# Patient Record
Sex: MALE | Race: WHITE | Employment: UNEMPLOYED | URBAN - METROPOLITAN AREA
[De-identification: names, ages, dates, MRNs, and addresses within clinical notes are randomized per-mention and may not be internally consistent; named-entity substitution may affect disease eponyms.]

---

## 2017-11-25 ENCOUNTER — HOSPITAL ENCOUNTER (EMERGENCY)
Age: 37
Discharge: LEFT BEFORE DISCHARGE | End: 2017-11-25
Attending: EMERGENCY MEDICINE | Admitting: EMERGENCY MEDICINE
Payer: COMMERCIAL

## 2017-11-25 VITALS
TEMPERATURE: 98.4 F | SYSTOLIC BLOOD PRESSURE: 129 MMHG | OXYGEN SATURATION: 95 % | HEART RATE: 85 BPM | RESPIRATION RATE: 18 BRPM | DIASTOLIC BLOOD PRESSURE: 77 MMHG

## 2017-11-25 DIAGNOSIS — J06.9 ACUTE UPPER RESPIRATORY INFECTION: Primary | ICD-10-CM

## 2017-11-25 PROCEDURE — 99281 EMR DPT VST MAYX REQ PHY/QHP: CPT

## 2017-11-25 RX ORDER — DIAZEPAM 5 MG/1
5 TABLET ORAL
Status: ON HOLD | COMMUNITY
End: 2022-03-11

## 2017-11-25 RX ORDER — PREDNISONE 20 MG/1
40 TABLET ORAL DAILY
Qty: 15 TAB | Refills: 0 | Status: SHIPPED | OUTPATIENT
Start: 2017-11-25 | End: 2017-11-29

## 2017-11-25 RX ORDER — PREDNISONE 20 MG/1
60 TABLET ORAL
Status: DISCONTINUED | OUTPATIENT
Start: 2017-11-25 | End: 2017-11-26 | Stop reason: HOSPADM

## 2017-11-25 RX ORDER — QUETIAPINE FUMARATE 400 MG/1
200 TABLET, FILM COATED ORAL 3 TIMES DAILY
COMMUNITY
End: 2022-03-16

## 2017-11-26 NOTE — DISCHARGE INSTRUCTIONS
Upper Respiratory Infection (Cold): Care Instructions  Your Care Instructions    An upper respiratory infection, or URI, is an infection of the nose, sinuses, or throat. URIs are spread by coughs, sneezes, and direct contact. The common cold is the most frequent kind of URI. The flu and sinus infections are other kinds of URIs. Almost all URIs are caused by viruses. Antibiotics won't cure them. But you can treat most infections with home care. This may include drinking lots of fluids and taking over-the-counter pain medicine. You will probably feel better in 4 to 10 days. The doctor has checked you carefully, but problems can develop later. If you notice any problems or new symptoms, get medical treatment right away. Follow-up care is a key part of your treatment and safety. Be sure to make and go to all appointments, and call your doctor if you are having problems. It's also a good idea to know your test results and keep a list of the medicines you take. How can you care for yourself at home? · To prevent dehydration, drink plenty of fluids, enough so that your urine is light yellow or clear like water. Choose water and other caffeine-free clear liquids until you feel better. If you have kidney, heart, or liver disease and have to limit fluids, talk with your doctor before you increase the amount of fluids you drink. · Take an over-the-counter pain medicine, such as acetaminophen (Tylenol), ibuprofen (Advil, Motrin), or naproxen (Aleve). Read and follow all instructions on the label. · Before you use cough and cold medicines, check the label. These medicines may not be safe for young children or for people with certain health problems. · Be careful when taking over-the-counter cold or flu medicines and Tylenol at the same time. Many of these medicines have acetaminophen, which is Tylenol. Read the labels to make sure that you are not taking more than the recommended dose.  Too much acetaminophen (Tylenol) can be harmful. · Get plenty of rest.  · Do not smoke or allow others to smoke around you. If you need help quitting, talk to your doctor about stop-smoking programs and medicines. These can increase your chances of quitting for good. When should you call for help? Call 911 anytime you think you may need emergency care. For example, call if:  ? · You have severe trouble breathing. ?Call your doctor now or seek immediate medical care if:  ? · You seem to be getting much sicker. ? · You have new or worse trouble breathing. ? · You have a new or higher fever. ? · You have a new rash. ? Watch closely for changes in your health, and be sure to contact your doctor if:  ? · You have a new symptom, such as a sore throat, an earache, or sinus pain. ? · You cough more deeply or more often, especially if you notice more mucus or a change in the color of your mucus. ? · You do not get better as expected. Where can you learn more? Go to http://gabriel-izabella.info/. Enter Z005 in the search box to learn more about \"Upper Respiratory Infection (Cold): Care Instructions. \"  Current as of: May 12, 2017  Content Version: 11.4  © 0065-6287 Healthwise, Incorporated. Care instructions adapted under license by [employer] (which disclaims liability or warranty for this information). If you have questions about a medical condition or this instruction, always ask your healthcare professional. [place] [address] any warranty or liability for your use of this information.

## 2017-11-26 NOTE — ED PROVIDER NOTES
EMERGENCY DEPARTMENT HISTORY AND PHYSICAL EXAM      Date: 11/25/2017  Patient Name: Mario Soto    History of Presenting Illness     Chief Complaint   Patient presents with    Cough     [facility] and given dx of \" slight pneumonia\". His friend states \" I don't like the diagnosis they gave him, they didn't tell us what lobe his pneumonia was in or nothing\". Patient unsure of name of Abx given, states took dose today, does not c/o feeling worse. History Provided By: Patient and Friend    HPI: Mario Soto, 42 y.o. male with PMHx significant for HLD presents ambulatory and accompanied by friend to the ED with cc of ongoing cough x two weeks with subjective fever today. Pt reports coughing spells in which he becomes lightheaded. Pt is a resident at [facility] and is currently home for the holidays. Per friend, pt was seen at [facility] today for current sx's where CXR was performed. Pt indicates they informed him he has mild PNA and was started on abx, however is unsure of the name. He states they did not give him an injection of abx while at [facility], however he endorses taking the first dose. Per friend, pt feels that his medical care at [facility] is not proactive and is looking for a second opinion regarding his dx and the possible need for any further care. He states he has not been treated for asthma before and denies having used an albuterol inhaler. Pt reports tobacco use, but denies EtOH and illicit drug use. He specifically denies any chills, nausea, and vomiting.      PCP: None    Current Facility-Administered Medications   Medication Dose Route Frequency Provider Last Rate Last Dose    predniSONE (DELTASONE) tablet 60 mg  60 mg Oral NOW Alecia Willis MD        cefTRIAXone (ROCEPHIN) 1 g in lidocaine (PF) (XYLOCAINE) 10 mg/mL (1 %) IM injection  1 g IntraMUSCular NOW Alecia Willis MD         Current Outpatient Prescriptions   Medication Sig Dispense Refill    predniSONE (DELTASONE) 20 mg tablet Take 2 Tabs by mouth daily for 4 days. 15 Tab 0    QUEtiapine (SEROQUEL) 200 mg tablet Take 200 mg by mouth two (2) times a day.  diazePAM (VALIUM) 5 mg tablet Take 5 mg by mouth every six (6) hours as needed for Anxiety. Past History     Past Medical History:  History reviewed. No pertinent past medical history. Past Surgical History:  History reviewed. No pertinent surgical history. Family History:  History reviewed. No pertinent family history. Social History:  Social History   Substance Use Topics    Smoking status: Never Smoker    Smokeless tobacco: Never Used    Alcohol use No       Allergies: Allergies   Allergen Reactions    Haldol [Haloperidol Lactate] Anxiety         Review of Systems   Review of Systems   Constitutional: Positive for fever (subjective). Negative for appetite change, chills, diaphoresis and fatigue. HENT: Negative for sore throat and trouble swallowing. Respiratory: Positive for cough. Negative for shortness of breath. Cardiovascular: Negative for chest pain. Gastrointestinal: Negative for abdominal pain, diarrhea, nausea and vomiting. Genitourinary: Negative for dysuria and frequency. Musculoskeletal: Negative for arthralgias, back pain and myalgias. Neurological: Positive for light-headedness. Negative for weakness and numbness. Hematological: Does not bruise/bleed easily. All other systems reviewed and are negative. Physical Exam   Physical Exam   Constitutional: He is oriented to person, place, and time. He appears well-developed and well-nourished. No distress. NAD   HENT:   Head: Normocephalic and atraumatic. Mouth/Throat: Oropharynx is clear and moist. No oropharyngeal exudate or posterior oropharyngeal erythema. Neck: Normal range of motion and full passive range of motion without pain. Neck supple.    Cardiovascular: Normal rate, regular rhythm, normal heart sounds, intact distal pulses and normal pulses. Exam reveals no gallop and no friction rub. No murmur heard. Pulmonary/Chest: Effort normal. No accessory muscle usage. No respiratory distress. He has no decreased breath sounds. He has wheezes. He has no rhonchi. He has no rales. Good air movement. Few end expiratory wheezes. Abdominal: Soft. Bowel sounds are normal. He exhibits no distension. There is no tenderness. There is no rebound, no guarding and no CVA tenderness. Musculoskeletal: Normal range of motion. He exhibits no edema or tenderness. Thoracic back: He exhibits no tenderness and no bony tenderness. Lumbar back: He exhibits no tenderness and no bony tenderness. Lymphadenopathy:     He has no cervical adenopathy. Neurological: He is alert and oriented to person, place, and time. He has normal strength. He is not disoriented. No cranial nerve deficit or sensory deficit. No focal deficits; 5/5 muscle strength in all extremities   Skin: Skin is warm. No lesion and no rash noted. Rash is not nodular. Pt with good color, no diaphoresis   Nursing note and vitals reviewed. Medical Decision Making   I am the first provider for this patient. I reviewed the vital signs, available nursing notes, past medical history, past surgical history, family history and social history. Vital Signs-Reviewed the patient's vital signs. Patient Vitals for the past 12 hrs:   Temp Pulse Resp BP SpO2   11/25/17 2110 98.4 °F (36.9 °C) 85 18 129/77 95 %       Records Reviewed: Nursing Notes and Old Medical Records    ED Course:   9:17 PM  Initial assessment performed. The patients presenting problems have been discussed, and they are in agreement with the care plan formulated and outlined with them. I have encouraged them to ask questions as they arise throughout their visit. Disposition:    10:20 PM  Pt eloped. No belongings in room. MD notified.    Written by MANOHAR Reilly, as dictated by Holger Prasad MD.    Provider Notes (Medical Decision Making):     DDx: asthma exacerbation, bronchitis, possible PNA, unlikely CHF        Diagnosis     Clinical Impression:   1. Acute upper respiratory infection      PLAN:  1. Pt eloped  Current Discharge Medication List      START taking these medications    Details   predniSONE (DELTASONE) 20 mg tablet Take 2 Tabs by mouth daily for 4 days. Qty: 15 Tab, Refills: 0             Attestations: This note is prepared by Puneet Pinon, acting as Scribe for Holger Prasad MD.    Holger Prasad MD:  The scribe's documentation has been prepared under my direction and personally reviewed by me in its entirety. I confirm that the note above accurately reflects all work, treatment, procedures, and medical decision making performed by me.

## 2019-05-29 ENCOUNTER — APPOINTMENT (OUTPATIENT)
Dept: GENERAL RADIOLOGY | Age: 39
End: 2019-05-29
Attending: EMERGENCY MEDICINE
Payer: MEDICARE

## 2019-05-29 ENCOUNTER — HOSPITAL ENCOUNTER (EMERGENCY)
Age: 39
Discharge: HOME | End: 2019-05-29
Attending: EMERGENCY MEDICINE
Payer: MEDICARE

## 2019-05-29 VITALS
OXYGEN SATURATION: 98 % | DIASTOLIC BLOOD PRESSURE: 84 MMHG | SYSTOLIC BLOOD PRESSURE: 129 MMHG | RESPIRATION RATE: 16 BRPM | TEMPERATURE: 98.7 F | HEART RATE: 114 BPM | HEIGHT: 68 IN

## 2019-05-29 DIAGNOSIS — J06.9 VIRAL UPPER RESPIRATORY TRACT INFECTION: Primary | ICD-10-CM

## 2019-05-29 PROCEDURE — 71046 X-RAY EXAM CHEST 2 VIEWS: CPT

## 2019-05-29 PROCEDURE — 99282 EMERGENCY DEPT VISIT SF MDM: CPT

## 2019-05-29 RX ORDER — LORATADINE 10 MG/1
10 TABLET ORAL DAILY
Qty: 15 TAB | Refills: 0 | Status: SHIPPED | OUTPATIENT
Start: 2019-05-29 | End: 2019-06-13

## 2019-05-29 RX ORDER — BENZOCAINE .13; .15; .5; 2 G/100G; G/100G; G/100G; G/100G
2 GEL ORAL DAILY
Qty: 1 BOTTLE | Refills: 0 | Status: ON HOLD | OUTPATIENT
Start: 2019-05-29 | End: 2022-03-11

## 2019-05-29 RX ORDER — ESCITALOPRAM OXALATE 10 MG/1
10 TABLET ORAL DAILY
COMMUNITY

## 2019-05-29 NOTE — ED PROVIDER NOTES
Initial Complaint: Nasal & Chest congestion    Started: Chest & nasal congestion x 1 week. Tried Afrin. This helps but doesn't last. Using between 3 days to 1 week. Endorses: Cough with deep breath, nasal congestion, rhinorrhea,   Denies: F/C, N/V, Chest pain, ear pain or drainage, difficulty swallowing or sore throat    Made better: Aftrin  Made worse: nothing    No further complaints. Past Medical History:  No date: Psychiatric disorder  History reviewed. No pertinent surgical history. Reviewed      Primary care provider: None  *Note: Patient states he is on a conditional release from [facility]. He inquired if I \"had every taken care of someone who was found not guilty by reason of insanity\". The history is provided by the patient. No  was used. Past Medical History:   Diagnosis Date    Psychiatric disorder      History reviewed. No pertinent surgical history. History reviewed. No pertinent family history.     Social History     Socioeconomic History    Marital status: SINGLE     Spouse name: Not on file    Number of children: Not on file    Years of education: Not on file    Highest education level: Not on file   Occupational History    Not on file   Social Needs    Financial resource strain: Not on file    Food insecurity:     Worry: Not on file     Inability: Not on file    Transportation needs:     Medical: Not on file     Non-medical: Not on file   Tobacco Use    Smoking status: Never Smoker    Smokeless tobacco: Never Used   Substance and Sexual Activity    Alcohol use: No    Drug use: No    Sexual activity: Never   Lifestyle    Physical activity:     Days per week: Not on file     Minutes per session: Not on file    Stress: Not on file   Relationships    Social connections:     Talks on phone: Not on file     Gets together: Not on file     Attends Hinduism service: Not on file     Active member of club or organization: Not on file Attends meetings of clubs or organizations: Not on file     Relationship status: Not on file    Intimate partner violence:     Fear of current or ex partner: Not on file     Emotionally abused: Not on file     Physically abused: Not on file     Forced sexual activity: Not on file   Other Topics Concern    Not on file   Social History Narrative    Not on file     ALLERGIES: Haldol [haloperidol lactate]    Review of Systems   Constitutional: Positive for activity change. Negative for chills and fever. HENT: Positive for congestion and rhinorrhea. Negative for ear discharge, ear pain, hearing loss, sore throat, trouble swallowing and voice change. Eyes: Negative. Negative for photophobia, pain, discharge, redness and visual disturbance. Respiratory: Negative for shortness of breath. Cardiovascular: Negative for chest pain. Gastrointestinal: Negative for nausea and vomiting. Neurological: Negative for dizziness and light-headedness. All other systems reviewed and are negative. Vitals:    05/29/19 1823 05/29/19 1910   BP: 129/84    Pulse: 99 (!) 114   Resp: 16    Temp: 98.7 °F (37.1 °C)    SpO2: 98%    Height: 5' 8\" (1.727 m)           Physical Exam   Constitutional: He is oriented to person, place, and time. He appears well-developed and well-nourished. HENT:   Head: Normocephalic and atraumatic. Neck: Normal range of motion. Neck supple. Cardiovascular: Regular rhythm, normal heart sounds and intact distal pulses. Tachycardia present. Pulses:       Radial pulses are 2+ on the right side, and 2+ on the left side. Pulmonary/Chest: Effort normal and breath sounds normal. No respiratory distress. He has no wheezes. He has no rales. He exhibits no tenderness. Abdominal: Soft. Bowel sounds are normal. There is no tenderness. There is no guarding. Musculoskeletal: Normal range of motion. Neurological: He is alert and oriented to person, place, and time. Skin: Skin is warm and dry. No erythema. Psychiatric: He has a normal mood and affect. His behavior is normal. Judgment and thought content normal.   Nursing note and vitals reviewed. MDM       Procedures    Assessment & Plan:     Orders Placed This Encounter    XR CHEST PA LAT    escitalopram oxalate (LEXAPRO) 10 mg tablet    clonazepam (KLONOPIN PO)    CLONIDINE HCL PO    budesonide (RHINOCORT AQUA) 32 mcg/actuation nasal spray    loratadine (CLARITIN) 10 mg tablet    guaiFENesin (MUCINEX) 1,200 mg Ta12 ER tablet     Tachycardia likely 2/2 Afrin use. Discussed with Bishop Saima MD,ED Provider    Moriah Benson NP  05/29/19  7:09 PM    CXR without acute findings. Claritin, mucinex & Rhinocort. Stop Afrin. PC referral to [facility]. Discussed return precautions. LABORATORY TESTS:  Labs Reviewed - No data to display    IMAGING RESULTS:  Xr Chest Pa Lat    Result Date: 5/29/2019  INDICATION: cough EXAM: CXR 2 View FINDINGS: Frontal and lateral views of the chest show clear lungs. Heart size is normal. There is no pulmonary edema. There is no evident pneumothorax, adenopathy or effusion. IMPRESSION: Normal two view chest x-ray. MEDICATIONS GIVEN:  Medications - No data to display    IMPRESSION:  1. Viral upper respiratory tract infection        PLAN:  1. Current Discharge Medication List      START taking these medications    Details   budesonide (RHINOCORT AQUA) 32 mcg/actuation nasal spray 2 Sprays by Both Nostrils route daily. Indications: inflammation of the nose due to an allergy  Qty: 1 Bottle, Refills: 0      loratadine (CLARITIN) 10 mg tablet Take 1 Tab by mouth daily for 15 days. Qty: 15 Tab, Refills: 0      guaiFENesin (MUCINEX) 1,200 mg Ta12 ER tablet Take 1 Tab by mouth two (2) times a day for 10 days. Qty: 20 Tab, Refills: 0           2.    Follow-up Information     Follow up With Specialties Details Why [address] [facility] Schedule an appointment as soon as possible for a visit Primary care referral [phone]    [facility] [address] DEP Emergency Medicine  As needed, If symptoms worsen [address] [facility]  [phone]        3.      Return to ED for new or worsening symptoms       Annia Rascon NP

## 2019-05-29 NOTE — DISCHARGE INSTRUCTIONS
Thank you for allowing us to care for you today. Please follow-up with your Primary Care provider in the next 2-3 days if your symptoms do not improve. Plan for home:     Take the Claritin and the Mucinex twice daily first long as you have symptoms for no longer than 10 days. Rhinocort Aqua 2 sprays to each nostril daily or 1 spray twice daily for at least 14 days. Followup with primary care if symptoms do not improve. Your viral illness can last as long as 3 weeks. Since you have no primary care provider listed we will refer you to [facility]. They are open 7 days a week. They are a part of the [employer]. They will have access to x-rays and labs you had done here in the Emergency Room. Patient Education        Upper Respiratory Infection (Cold): Care Instructions  Your Care Instructions    An upper respiratory infection, or URI, is an infection of the nose, sinuses, or throat. URIs are spread by coughs, sneezes, and direct contact. The common cold is the most frequent kind of URI. The flu and sinus infections are other kinds of URIs. Almost all URIs are caused by viruses. Antibiotics won't cure them. But you can treat most infections with home care. This may include drinking lots of fluids and taking over-the-counter pain medicine. You will probably feel better in 4 to 10 days. The doctor has checked you carefully, but problems can develop later. If you notice any problems or new symptoms, get medical treatment right away. Follow-up care is a key part of your treatment and safety. Be sure to make and go to all appointments, and call your doctor if you are having problems. It's also a good idea to know your test results and keep a list of the medicines you take. How can you care for yourself at home? · To prevent dehydration, drink plenty of fluids, enough so that your urine is light yellow or clear like water.  Choose water and other caffeine-free clear liquids until you feel better. If you have kidney, heart, or liver disease and have to limit fluids, talk with your doctor before you increase the amount of fluids you drink. · Take an over-the-counter pain medicine, such as acetaminophen (Tylenol), ibuprofen (Advil, Motrin), or naproxen (Aleve). Read and follow all instructions on the label. · Before you use cough and cold medicines, check the label. These medicines may not be safe for young children or for people with certain health problems. · Be careful when taking over-the-counter cold or flu medicines and Tylenol at the same time. Many of these medicines have acetaminophen, which is Tylenol. Read the labels to make sure that you are not taking more than the recommended dose. Too much acetaminophen (Tylenol) can be harmful. · Get plenty of rest.  · Do not smoke or allow others to smoke around you. If you need help quitting, talk to your doctor about stop-smoking programs and medicines. These can increase your chances of quitting for good. When should you call for help? Call 911 anytime you think you may need emergency care. For example, call if:    · You have severe trouble breathing.    Call your doctor now or seek immediate medical care if:    · You seem to be getting much sicker.     · You have new or worse trouble breathing.     · You have a new or higher fever.     · You have a new rash.    Watch closely for changes in your health, and be sure to contact your doctor if:    · You have a new symptom, such as a sore throat, an earache, or sinus pain.     · You cough more deeply or more often, especially if you notice more mucus or a change in the color of your mucus.     · You do not get better as expected. Where can you learn more? Go to http://gabriel-izabella.info/. Enter N826 in the search box to learn more about \"Upper Respiratory Infection (Cold): Care Instructions. \"  Current as of: September 5, 2018  Content Version: 11.9  © 6960-7188 Healthwise, Incorporated. Care instructions adapted under license by [employer] (which disclaims liability or warranty for this information). If you have questions about a medical condition or this instruction, always ask your healthcare professional. [address] any warranty or liability for your use of this information.

## 2019-05-29 NOTE — ED TRIAGE NOTES
Patient arrives for chest and nasal congestion. Reports productive cough for three days. Reports his friend is sick with a cold also.  Patient has not taken any medications prior to arrival.

## 2019-09-02 ENCOUNTER — HOSPITAL ENCOUNTER (EMERGENCY)
Age: 39
Discharge: HOME | End: 2019-09-02
Attending: EMERGENCY MEDICINE | Admitting: EMERGENCY MEDICINE
Payer: MEDICARE

## 2019-09-02 VITALS
BODY MASS INDEX: 30.31 KG/M2 | TEMPERATURE: 98.4 F | WEIGHT: 200 LBS | OXYGEN SATURATION: 98 % | DIASTOLIC BLOOD PRESSURE: 86 MMHG | HEIGHT: 68 IN | HEART RATE: 117 BPM | SYSTOLIC BLOOD PRESSURE: 132 MMHG

## 2019-09-02 DIAGNOSIS — K08.89 DENTALGIA: Primary | ICD-10-CM

## 2019-09-02 PROCEDURE — 99283 EMERGENCY DEPT VISIT LOW MDM: CPT

## 2019-09-02 PROCEDURE — 74011250637 HC RX REV CODE- 250/637: Performed by: PHYSICIAN ASSISTANT

## 2019-09-02 RX ORDER — NAPROXEN 250 MG/1
500 TABLET ORAL
Status: COMPLETED | OUTPATIENT
Start: 2019-09-02 | End: 2019-09-02

## 2019-09-02 RX ORDER — NAPROXEN 500 MG/1
500 TABLET ORAL 2 TIMES DAILY WITH MEALS
Qty: 20 TAB | Refills: 0 | Status: SHIPPED | OUTPATIENT
Start: 2019-09-02 | End: 2019-09-12

## 2019-09-02 RX ORDER — PENICILLIN V POTASSIUM 500 MG/1
500 TABLET, FILM COATED ORAL 4 TIMES DAILY
Qty: 28 TAB | Refills: 0 | Status: SHIPPED | OUTPATIENT
Start: 2019-09-02 | End: 2019-09-09

## 2019-09-02 RX ADMIN — NAPROXEN 500 MG: 250 TABLET ORAL at 18:57

## 2019-09-02 NOTE — DISCHARGE INSTRUCTIONS
Patient Education      [address]  [address], [address] [place] [address], [address]Camron Nunez Final   Phone: [phone], select option (2) to confirm time for treatment     [facility]  [address], [address] [place] [address], [address] JOSE ARMANDO/Camron [place]   Monday-Friday: 8am-4pm  Phone: [phone] of Dentistry Urgent [address] [place] [address] Dentistry, [address] [facility], [facility]Camron Nunez Final [address] [place] [address]  Phone: [phone] to confirm a time for emergency treatment  Pediatrics: [phone]  $75 per tooth, extractions only     [facility]  [address], [address] [place] [address]   Phone [phone] or [phone]  Hours [id number] (extractions)  Simple tooth extraction: $60 per tooth, $55 per x-ray     [facility] (in [place])  [facility] only  Phone: [phone], leave message saying you need an appointment to register  Hours: Wed 6-9p       Non-Urgent [facility] ([facility])  [address] [facility], [place] [address]  Phone: [phone]     A[address]. [address] [facility] [address] [facility], [address] [place] [address]   Dental Clinic: [phone]  Oral Surgery Clinic: [phone]    Tooth and Gum Pain: Care Instructions  Your Care Instructions    The most common causes of dental pain are tooth decay and gum disease. Pain can also be caused by an infection of the tooth (abscess) or the gums. Or you may have pain from a broken or cracked tooth. Other causes of pain include infection and damage to a tooth from nervous grinding of your teeth. A wisdom tooth can be painful when it is coming in but cannot break through the gum. It can also be painful when the tooth is only partway in and extra gum tissue has formed around it. The tissue can get inflamed (pericoronitis), and sometimes it gets infected. Prompt dental care can help find the cause of your toothache and keep the tooth from dying or gum disease from getting worse. Self-care at home may reduce your pain and discomfort. Follow-up care is a key part of your treatment and safety. Be sure to make and go to all appointments, and call your dentist or doctor if you are having problems. It's also a good idea to know your test results and keep a list of the medicines you take. How can you care for yourself at home? · To reduce pain and facial swelling, put an ice or cold pack on the outside of your cheek for 10 to 20 minutes at a time. Put a thin cloth between the ice and your skin. Do not use heat. · If your doctor prescribed antibiotics, take them as directed. Do not stop taking them just because you feel better. You need to take the full course of antibiotics. · Ask your doctor if you can take an over-the-counter pain medicine, such as acetaminophen (Tylenol), ibuprofen (Advil, Motrin), or naproxen (Aleve). Be safe with medicines. Read and follow all instructions on the label. · Avoid very hot, cold, or sweet foods and drinks if they increase your pain. · Rinse your mouth with warm salt water every 2 hours to help relieve pain and swelling. Mix 1 teaspoon of salt in 8 ounces of water. · Talk to your dentist about using special toothpaste for sensitive teeth. To reduce pain on contact with heat or cold or when brushing, brush with this toothpaste regularly or rub a small amount of the paste on the sensitive area with a clean finger 2 or 3 times a day. Floss gently between your teeth. · Do not smoke or use spit tobacco. Tobacco use can make gum problems worse, decreases your ability to fight infection in your gums, and delays healing. If you need help quitting, talk to your doctor about stop-smoking programs and medicines. These can increase your chances of quitting for good. When should you call for help? Call 911 anytime you think you may need emergency care.  For example, call if:    · You have trouble breathing.    Call your dentist or doctor now or seek immediate medical care if:    · You have signs of infection, such as:  ? Increased pain, swelling, warmth, or redness. ? Red streaks leading from the area. ? Pus draining from the area. ? A fever.    Watch closely for changes in your health, and be sure to contact your doctor if:    · You do not get better as expected. Where can you learn more? Go to http://gabriel-izabella.info/. Enter 0363 [id number] in the search box to learn more about \"Tooth and Gum Pain: Care Instructions. \"  Current as of: October 3, 2018  Content Version: 12.1  © 3370-8665 [employer]. Care instructions adapted under license by [email or web address] (which disclaims liability or warranty for this information). If you have questions about a medical condition or this instruction, always ask your healthcare professional. [address] any warranty or liability for your use of this information.

## 2019-09-02 NOTE — ED PROVIDER NOTES
36 yo male with hx of OCD and bipolar disorder presenting with complaint of rt lower dental pain that began today while eating at [facility] with associated swelling to the area. No medications for pain PTA. Known hx of caries in the area. No dentist. No fever, headache, sore throat, cough, rhinorrhea, sneezing, SOB, abdominal pain, nausea, vomiting, or urinary complaints. Past Medical History:   Diagnosis Date    OCD (obsessive compulsive disorder)     Psychiatric disorder        No past surgical history on file. No family history on file.     Social History     Socioeconomic History    Marital status: SINGLE     Spouse name: Not on file    Number of children: Not on file    Years of education: Not on file    Highest education level: Not on file   Occupational History    Not on file   Social Needs    Financial resource strain: Not on file    Food insecurity:     Worry: Not on file     Inability: Not on file    Transportation needs:     Medical: Not on file     Non-medical: Not on file   Tobacco Use    Smoking status: Current Every Day Smoker     Packs/day: 0.50    Smokeless tobacco: Never Used   Substance and Sexual Activity    Alcohol use: No    Drug use: No    Sexual activity: Never   Lifestyle    Physical activity:     Days per week: Not on file     Minutes per session: Not on file    Stress: Not on file   Relationships    Social connections:     Talks on phone: Not on file     Gets together: Not on file     Attends Jew service: Not on file     Active member of club or organization: Not on file     Attends meetings of clubs or organizations: Not on file     Relationship status: Not on file    Intimate partner violence:     Fear of current or ex partner: Not on file     Emotionally abused: Not on file     Physically abused: Not on file     Forced sexual activity: Not on file   Other Topics Concern    Not on file   Social History Narrative    Not on file ALLERGIES: Haldol [haloperidol lactate]    Review of Systems   Constitutional: Negative. Negative for chills and fever. HENT: Positive for dental problem. Negative for congestion, drooling, ear pain, facial swelling, rhinorrhea, sore throat, trouble swallowing and voice change. Eyes: Negative. Negative for photophobia. Respiratory: Negative for cough, chest tightness and shortness of breath. Cardiovascular: Negative for chest pain. Gastrointestinal: Negative for abdominal pain, constipation, diarrhea, nausea and vomiting. Genitourinary: Negative for difficulty urinating, dysuria, frequency and urgency. Musculoskeletal: Negative for neck stiffness. Neurological: Negative for weakness, numbness and headaches. All other systems reviewed and are negative. Vitals:    09/02/19 1831   Pulse: (!) 108   SpO2: 99%            Physical Exam   Constitutional: He is oriented to person, place, and time. He appears well-developed and well-nourished. No distress. Adult male in NAD   HENT:   Head: Normocephalic and atraumatic. Right Ear: Tympanic membrane, external ear and ear canal normal.   Left Ear: Tympanic membrane, external ear and ear canal normal.   Nose: Nose normal.   Mouth/Throat: Oropharynx is clear and moist. No oropharyngeal exudate. Eyes: Pupils are equal, round, and reactive to light. Conjunctivae and EOM are normal. Right eye exhibits no discharge. Left eye exhibits no discharge. Neck: Normal range of motion. Neck supple. Cardiovascular: Regular rhythm and normal heart sounds. Tachycardic     Pulmonary/Chest: Effort normal and breath sounds normal. He has no wheezes. He has no rales. Abdominal: Soft. Bowel sounds are normal. He exhibits no distension. There is no tenderness. There is no guarding. Musculoskeletal: Normal range of motion. Lymphadenopathy:     He has no cervical adenopathy. Neurological: He is alert and oriented to person, place, and time.  No cranial nerve deficit. Skin: Skin is warm and dry. He is not diaphoretic. Psychiatric: He has a normal mood and affect. His behavior is normal.   Nursing note and vitals reviewed. MDM  Number of Diagnoses or Management Options  Diagnosis management comments: 36 yo male with complaint of dentalgia. Caries noted. No e/o deeper head/ neck infection. Will start on PEN VK, Naprosyn with dental follow-up. [place]           Procedures    Patient's results have been reviewed with them. Patient and/or family have verbally conveyed their understanding and agreement of the patient's signs, symptoms, diagnosis, treatment and prognosis and additionally agree to follow up as recommended or return to the Emergency Room should their condition change prior to follow-up. Discharge instructions have also been provided to the patient with some educational information regarding their diagnosis as well a list of reasons why they would want to return to the ER prior to their follow-up appointment should their condition change.  Flor Mcgill [place]

## 2019-09-02 NOTE — ED TRIAGE NOTES
Pt presents with right sided tooth pain.  Pt states, \"I was eating and it felt like it hurt and hasn't gone away since I ate\"

## 2020-01-26 ENCOUNTER — HOSPITAL ENCOUNTER (EMERGENCY)
Age: 40
Discharge: HOME | End: 2020-01-26
Attending: EMERGENCY MEDICINE
Payer: SELF-PAY

## 2020-01-26 ENCOUNTER — APPOINTMENT (OUTPATIENT)
Dept: GENERAL RADIOLOGY | Age: 40
End: 2020-01-26
Attending: PHYSICIAN ASSISTANT
Payer: SELF-PAY

## 2020-01-26 VITALS
BODY MASS INDEX: 30.31 KG/M2 | TEMPERATURE: 98.8 F | WEIGHT: 200 LBS | OXYGEN SATURATION: 95 % | DIASTOLIC BLOOD PRESSURE: 70 MMHG | SYSTOLIC BLOOD PRESSURE: 126 MMHG | HEIGHT: 68 IN | HEART RATE: 119 BPM | RESPIRATION RATE: 18 BRPM

## 2020-01-26 DIAGNOSIS — J06.9 VIRAL URI: ICD-10-CM

## 2020-01-26 DIAGNOSIS — R05.9 COUGH: Primary | ICD-10-CM

## 2020-01-26 PROCEDURE — 74011250637 HC RX REV CODE- 250/637: Performed by: PHYSICIAN ASSISTANT

## 2020-01-26 PROCEDURE — 71046 X-RAY EXAM CHEST 2 VIEWS: CPT

## 2020-01-26 PROCEDURE — 99283 EMERGENCY DEPT VISIT LOW MDM: CPT

## 2020-01-26 RX ORDER — BENZONATATE 100 MG/1
100 CAPSULE ORAL
Status: COMPLETED | OUTPATIENT
Start: 2020-01-26 | End: 2020-01-26

## 2020-01-26 RX ORDER — BENZONATATE 100 MG/1
100 CAPSULE ORAL
Qty: 21 CAP | Refills: 0 | Status: SHIPPED | OUTPATIENT
Start: 2020-01-26 | End: 2020-02-02

## 2020-01-26 RX ADMIN — BENZONATATE 100 MG: 100 CAPSULE ORAL at 17:46

## 2020-01-26 NOTE — DISCHARGE INSTRUCTIONS
Patient Education        Cough: Care Instructions  Your Care Instructions    A cough is your body's response to something that bothers your throat or airways. Many things can cause a cough. You might cough because of a cold or the flu, bronchitis, or asthma. Smoking, postnasal drip, allergies, and stomach acid that backs up into your throat also can cause coughs. A cough is a symptom, not a disease. Most coughs stop when the cause, such as a cold, goes away. You can take a few steps at home to cough less and feel better. Follow-up care is a key part of your treatment and safety. Be sure to make and go to all appointments, and call your doctor if you are having problems. It's also a good idea to know your test results and keep a list of the medicines you take. How can you care for yourself at home? · Drink lots of water and other fluids. This helps thin the mucus and soothes a dry or sore throat. Honey or lemon juice in hot water or tea may ease a dry cough. · Take cough medicine as directed by your doctor. · Prop up your head on pillows to help you breathe and ease a dry cough. · Try cough drops to soothe a dry or sore throat. Cough drops don't stop a cough. Medicine-flavored cough drops are no better than candy-flavored drops or hard candy. · Do not smoke. Avoid secondhand smoke. If you need help quitting, talk to your doctor about stop-smoking programs and medicines. These can increase your chances of quitting for good. When should you call for help? Call 911 anytime you think you may need emergency care.  For example, call if:    · You have severe trouble breathing.    Call your doctor now or seek immediate medical care if:    · You cough up blood.     · You have new or worse trouble breathing.     · You have a new or higher fever.     · You have a new rash.    Watch closely for changes in your health, and be sure to contact your doctor if:    · You cough more deeply or more often, especially if you notice more mucus or a change in the color of your mucus.     · You have new symptoms, such as a sore throat, an earache, or sinus pain.     · You do not get better as expected. Where can you learn more? Go to http://gabriel-izabella.info/. Enter D279 in the search box to learn more about \"Cough: Care Instructions. \"  Current as of: June 9, 2019  Content Version: 12.2  © 4364-0350 [employer]. Care instructions adapted under license by [employer] (which disclaims liability or warranty for this information). If you have questions about a medical condition or this instruction, always ask your healthcare professional. [address] any warranty or liability for your use of this information.

## 2020-01-26 NOTE — ED TRIAGE NOTES
Patient arrives with c/o productive cough, sore throat, runny nose with fever/chills and CP/rib pain with cough since Wednesday.   Denies n/v/d, taking Robitussin with minimal relief

## 2020-01-26 NOTE — ED PROVIDER NOTES
38 y/o male with PMHx of OCD, presenting with complaint of cough and shortness of breath. The patient reports an onset of cough, nasal congestion and sore throat 4 days ago, followed by right ear pain 2 days ago. He has been taking Robitussin with some relief of his cough. He notes that he has felt short of breath occasionally. He endorses lightheadedness but denies fevers, chest pain, nausea, vomiting, diarrhea or generalized body aches. The history is provided by the patient. Past Medical History:   Diagnosis Date    OCD (obsessive compulsive disorder)     Psychiatric disorder        History reviewed. No pertinent surgical history. History reviewed. No pertinent family history.     Social History     Socioeconomic History    Marital status: SINGLE     Spouse name: Not on file    Number of children: Not on file    Years of education: Not on file    Highest education level: Not on file   Occupational History    Not on file   Social Needs    Financial resource strain: Not on file    Food insecurity:     Worry: Not on file     Inability: Not on file    Transportation needs:     Medical: Not on file     Non-medical: Not on file   Tobacco Use    Smoking status: Current Every Day Smoker     Packs/day: 0.50    Smokeless tobacco: Never Used   Substance and Sexual Activity    Alcohol use: No    Drug use: No    Sexual activity: Never   Lifestyle    Physical activity:     Days per week: Not on file     Minutes per session: Not on file    Stress: Not on file   Relationships    Social connections:     Talks on phone: Not on file     Gets together: Not on file     Attends Yazdanism service: Not on file     Active member of club or organization: Not on file     Attends meetings of clubs or organizations: Not on file     Relationship status: Not on file    Intimate partner violence:     Fear of current or ex partner: Not on file     Emotionally abused: Not on file     Physically abused: Not on file     Forced sexual activity: Not on file   Other Topics Concern    Not on file   Social History Narrative    Not on file         ALLERGIES: Haldol [haloperidol lactate]    Review of Systems   Constitutional: Negative for chills and fever. HENT: Positive for congestion and sore throat. Respiratory: Positive for cough, chest tightness and shortness of breath. Cardiovascular: Negative for chest pain. Gastrointestinal: Negative for diarrhea, nausea and vomiting. Musculoskeletal: Negative for arthralgias and myalgias. Neurological: Positive for light-headedness. All other systems reviewed and are negative. Vitals:    01/26/20 1607   BP: 126/70   Pulse: (!) 119   Resp: 18   Temp: 98.8 °F (37.1 °C)   SpO2: 95%   Weight: 90.7 kg (200 lb)   Height: 5' 8\" (1.727 m)            Physical Exam  Vitals signs and nursing note reviewed. Constitutional:       General: He is not in acute distress. Appearance: He is well-developed. He is not diaphoretic. HENT:      Head: Normocephalic and atraumatic. Right Ear: Hearing, tympanic membrane, ear canal and external ear normal.      Left Ear: Hearing, tympanic membrane, ear canal and external ear normal.      Mouth/Throat:      Mouth: Mucous membranes are moist.      Pharynx: Oropharynx is clear. Uvula midline. Posterior oropharyngeal erythema (minimal) present. No pharyngeal swelling, oropharyngeal exudate or uvula swelling. Tonsils: No tonsillar abscesses. Eyes:      Conjunctiva/sclera: Conjunctivae normal.   Neck:      Musculoskeletal: Normal range of motion and neck supple. Cardiovascular:      Rate and Rhythm: Tachycardia present. Heart sounds: Normal heart sounds. Pulmonary:      Effort: Pulmonary effort is normal.      Breath sounds: Normal breath sounds. No wheezing, rhonchi or rales. Skin:     General: Skin is warm and dry. Neurological:      Mental Status: He is alert and oriented to person, place, and time. MDM  Number of Diagnoses or Management Options  Cough:   Viral URI:      Amount and/or Complexity of Data Reviewed  Tests in the radiology section of CPT®: ordered and reviewed  Independent visualization of images, tracings, or specimens: yes (CXR)    Patient Progress  Patient progress: stable         Procedures          36 y/o male with PMHx of OCD, presenting with complaint of cough and shortness of breath. History and exam consistent with influenza or other viral illness. CXR, read by radiology and independently visualized and interpreted by myself, reveals no evidence of pneumonia or other acute cardiopulmonary abnormalities. Plan is for discharge home with Rx for Tessalon and instructions for PCP follow up. Strict ED return precautions discussed and provided in writing at time of discharge. The patient verbalized understanding and agreement with this plan.

## 2022-03-10 ENCOUNTER — HOSPITAL ENCOUNTER (INPATIENT)
Age: 42
LOS: 6 days | Discharge: HOME | DRG: 885 | End: 2022-03-16
Attending: EMERGENCY MEDICINE | Admitting: PSYCHIATRY & NEUROLOGY
Payer: MEDICARE

## 2022-03-10 DIAGNOSIS — F42.9 OBSESSIVE-COMPULSIVE DISORDER, UNSPECIFIED TYPE: Primary | ICD-10-CM

## 2022-03-10 PROBLEM — F25.9 SCHIZOAFFECTIVE DISORDER (HCC): Status: ACTIVE | Noted: 2022-03-10

## 2022-03-10 LAB
ALBUMIN SERPL-MCNC: 4.1 G/DL (ref 3.5–5)
ALBUMIN/GLOB SERPL: 1.2 {RATIO} (ref 1.1–2.2)
ALP SERPL-CCNC: 84 U/L (ref 45–117)
ALT SERPL-CCNC: 40 U/L (ref 12–78)
AMPHET UR QL SCN: NEGATIVE
ANION GAP SERPL CALC-SCNC: 5 MMOL/L (ref 5–15)
APAP SERPL-MCNC: <2 UG/ML (ref 10–30)
APPEARANCE UR: CLEAR
AST SERPL-CCNC: 25 U/L (ref 15–37)
BACTERIA URNS QL MICRO: NEGATIVE /HPF
BARBITURATES UR QL SCN: NEGATIVE
BASOPHILS # BLD: 0 K/UL (ref 0–0.1)
BASOPHILS NFR BLD: 1 % (ref 0–1)
BENZODIAZ UR QL: POSITIVE
BILIRUB SERPL-MCNC: 0.6 MG/DL (ref 0.2–1)
BILIRUB UR QL: NEGATIVE
BUN SERPL-MCNC: 12 MG/DL (ref 6–20)
BUN/CREAT SERPL: 10 (ref 12–20)
CALCIUM SERPL-MCNC: 9.4 MG/DL (ref 8.5–10.1)
CANNABINOIDS UR QL SCN: POSITIVE
CHLORIDE SERPL-SCNC: 106 MMOL/L (ref 97–108)
CO2 SERPL-SCNC: 27 MMOL/L (ref 21–32)
COCAINE UR QL SCN: NEGATIVE
COLOR UR: NORMAL
CREAT SERPL-MCNC: 1.25 MG/DL (ref 0.7–1.3)
DIFFERENTIAL METHOD BLD: ABNORMAL
DRUG SCRN COMMENT,DRGCM: ABNORMAL
EOSINOPHIL # BLD: 0.1 K/UL (ref 0–0.4)
EOSINOPHIL NFR BLD: 2 % (ref 0–7)
EPITH CASTS URNS QL MICRO: NORMAL /LPF
ERYTHROCYTE [DISTWIDTH] IN BLOOD BY AUTOMATED COUNT: 12.6 % (ref 11.5–14.5)
ETHANOL SERPL-MCNC: <10 MG/DL
FLUAV RNA SPEC QL NAA+PROBE: NOT DETECTED
FLUBV RNA SPEC QL NAA+PROBE: NOT DETECTED
GLOBULIN SER CALC-MCNC: 3.3 G/DL (ref 2–4)
GLUCOSE SERPL-MCNC: 84 MG/DL (ref 65–100)
GLUCOSE UR STRIP.AUTO-MCNC: NEGATIVE MG/DL
HCT VFR BLD AUTO: 49 % (ref 36.6–50.3)
HGB BLD-MCNC: 17.3 G/DL (ref 12.1–17)
HGB UR QL STRIP: NEGATIVE
HYALINE CASTS URNS QL MICRO: NORMAL /LPF (ref 0–5)
IMM GRANULOCYTES # BLD AUTO: 0 K/UL (ref 0–0.04)
IMM GRANULOCYTES NFR BLD AUTO: 0 % (ref 0–0.5)
KETONES UR QL STRIP.AUTO: NEGATIVE MG/DL
LEUKOCYTE ESTERASE UR QL STRIP.AUTO: NEGATIVE
LYMPHOCYTES # BLD: 2.3 K/UL (ref 0.8–3.5)
LYMPHOCYTES NFR BLD: 31 % (ref 12–49)
MCH RBC QN AUTO: 32.2 PG (ref 26–34)
MCHC RBC AUTO-ENTMCNC: 35.3 G/DL (ref 30–36.5)
MCV RBC AUTO: 91.1 FL (ref 80–99)
METHADONE UR QL: NEGATIVE
MONOCYTES # BLD: 0.9 K/UL (ref 0–1)
MONOCYTES NFR BLD: 13 % (ref 5–13)
NEUTS SEG # BLD: 3.8 K/UL (ref 1.8–8)
NEUTS SEG NFR BLD: 53 % (ref 32–75)
NITRITE UR QL STRIP.AUTO: NEGATIVE
NRBC # BLD: 0 K/UL (ref 0–0.01)
NRBC BLD-RTO: 0 PER 100 WBC
OPIATES UR QL: NEGATIVE
PCP UR QL: NEGATIVE
PH UR STRIP: 7 [PH] (ref 5–8)
PLATELET # BLD AUTO: 229 K/UL (ref 150–400)
PMV BLD AUTO: 12.1 FL (ref 8.9–12.9)
POTASSIUM SERPL-SCNC: 3.6 MMOL/L (ref 3.5–5.1)
PROT SERPL-MCNC: 7.4 G/DL (ref 6.4–8.2)
PROT UR STRIP-MCNC: NEGATIVE MG/DL
RBC # BLD AUTO: 5.38 M/UL (ref 4.1–5.7)
RBC #/AREA URNS HPF: NORMAL /HPF (ref 0–5)
SALICYLATES SERPL-MCNC: 2.8 MG/DL (ref 2.8–20)
SARS-COV-2, COV2: NOT DETECTED
SODIUM SERPL-SCNC: 138 MMOL/L (ref 136–145)
SP GR UR REFRACTOMETRY: 1.01 (ref 1–1.03)
TSH SERPL DL<=0.05 MIU/L-ACNC: 2.16 UIU/ML (ref 0.36–3.74)
UR CULT HOLD, URHOLD: NORMAL
UROBILINOGEN UR QL STRIP.AUTO: 1 EU/DL (ref 0.2–1)
WBC # BLD AUTO: 7.2 K/UL (ref 4.1–11.1)
WBC URNS QL MICRO: NORMAL /HPF (ref 0–4)

## 2022-03-10 PROCEDURE — 65220000003 HC RM SEMIPRIVATE PSYCH

## 2022-03-10 PROCEDURE — 80307 DRUG TEST PRSMV CHEM ANLYZR: CPT

## 2022-03-10 PROCEDURE — 99285 EMERGENCY DEPT VISIT HI MDM: CPT

## 2022-03-10 PROCEDURE — 80143 DRUG ASSAY ACETAMINOPHEN: CPT

## 2022-03-10 PROCEDURE — 87636 SARSCOV2 & INF A&B AMP PRB: CPT

## 2022-03-10 PROCEDURE — 85025 COMPLETE CBC W/AUTO DIFF WBC: CPT

## 2022-03-10 PROCEDURE — 81001 URINALYSIS AUTO W/SCOPE: CPT

## 2022-03-10 PROCEDURE — 82077 ASSAY SPEC XCP UR&BREATH IA: CPT

## 2022-03-10 PROCEDURE — 80179 DRUG ASSAY SALICYLATE: CPT

## 2022-03-10 PROCEDURE — 84443 ASSAY THYROID STIM HORMONE: CPT

## 2022-03-10 PROCEDURE — 74011250637 HC RX REV CODE- 250/637: Performed by: NURSE PRACTITIONER

## 2022-03-10 PROCEDURE — 80053 COMPREHEN METABOLIC PANEL: CPT

## 2022-03-10 RX ORDER — OLANZAPINE 5 MG/1
5 TABLET ORAL
Status: DISCONTINUED | OUTPATIENT
Start: 2022-03-10 | End: 2022-03-16 | Stop reason: HOSPADM

## 2022-03-10 RX ORDER — TRAZODONE HYDROCHLORIDE 50 MG/1
50 TABLET ORAL
Status: DISCONTINUED | OUTPATIENT
Start: 2022-03-10 | End: 2022-03-15

## 2022-03-10 RX ORDER — CLONAZEPAM 1 MG/1
1 TABLET ORAL
Status: COMPLETED | OUTPATIENT
Start: 2022-03-10 | End: 2022-03-10

## 2022-03-10 RX ORDER — ADHESIVE BANDAGE
30 BANDAGE TOPICAL DAILY PRN
Status: DISCONTINUED | OUTPATIENT
Start: 2022-03-10 | End: 2022-03-16 | Stop reason: HOSPADM

## 2022-03-10 RX ORDER — DIPHENHYDRAMINE HYDROCHLORIDE 50 MG/ML
50 INJECTION, SOLUTION INTRAMUSCULAR; INTRAVENOUS
Status: DISCONTINUED | OUTPATIENT
Start: 2022-03-10 | End: 2022-03-16 | Stop reason: HOSPADM

## 2022-03-10 RX ORDER — ACETAMINOPHEN 325 MG/1
650 TABLET ORAL
Status: DISCONTINUED | OUTPATIENT
Start: 2022-03-10 | End: 2022-03-15

## 2022-03-10 RX ORDER — HYDROXYZINE 50 MG/1
50 TABLET, FILM COATED ORAL
Status: DISCONTINUED | OUTPATIENT
Start: 2022-03-10 | End: 2022-03-11

## 2022-03-10 RX ORDER — LORAZEPAM 2 MG/ML
1 INJECTION INTRAMUSCULAR
Status: DISCONTINUED | OUTPATIENT
Start: 2022-03-10 | End: 2022-03-16 | Stop reason: HOSPADM

## 2022-03-10 RX ORDER — BENZTROPINE MESYLATE 1 MG/1
1 TABLET ORAL
Status: DISCONTINUED | OUTPATIENT
Start: 2022-03-10 | End: 2022-03-16 | Stop reason: HOSPADM

## 2022-03-10 RX ADMIN — CLONAZEPAM 1 MG: 1 TABLET ORAL at 20:12

## 2022-03-10 RX ADMIN — HYDROXYZINE HYDROCHLORIDE 50 MG: 50 TABLET, FILM COATED ORAL at 23:03

## 2022-03-10 RX ADMIN — TRAZODONE HYDROCHLORIDE 50 MG: 50 TABLET ORAL at 23:03
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
## 2022-03-11 LAB
ATRIAL RATE: 88 BPM
CALCULATED P AXIS, ECG09: 71 DEGREES
CALCULATED R AXIS, ECG10: 84 DEGREES
CALCULATED T AXIS, ECG11: 74 DEGREES
CHOLEST SERPL-MCNC: 246 MG/DL
DIAGNOSIS, 93000: NORMAL
GLUCOSE P FAST SERPL-MCNC: 93 MG/DL (ref 65–100)
HDLC SERPL-MCNC: 33 MG/DL
HDLC SERPL: 7.5 {RATIO} (ref 0–5)
LDLC SERPL CALC-MCNC: 159.4 MG/DL (ref 0–100)
P-R INTERVAL, ECG05: 142 MS
Q-T INTERVAL, ECG07: 354 MS
QRS DURATION, ECG06: 72 MS
QTC CALCULATION (BEZET), ECG08: 428 MS
TRIGL SERPL-MCNC: 268 MG/DL (ref ?–150)
VENTRICULAR RATE, ECG03: 88 BPM
VLDLC SERPL CALC-MCNC: 53.6 MG/DL

## 2022-03-11 PROCEDURE — 65220000003 HC RM SEMIPRIVATE PSYCH

## 2022-03-11 PROCEDURE — 82947 ASSAY GLUCOSE BLOOD QUANT: CPT

## 2022-03-11 PROCEDURE — 74011250637 HC RX REV CODE- 250/637: Performed by: NURSE PRACTITIONER

## 2022-03-11 PROCEDURE — 36415 COLL VENOUS BLD VENIPUNCTURE: CPT

## 2022-03-11 PROCEDURE — 80061 LIPID PANEL: CPT

## 2022-03-11 PROCEDURE — 93005 ELECTROCARDIOGRAM TRACING: CPT

## 2022-03-11 PROCEDURE — 74011250637 HC RX REV CODE- 250/637: Performed by: PSYCHIATRY & NEUROLOGY

## 2022-03-11 RX ORDER — IBUPROFEN 400 MG/1
400 TABLET ORAL
Status: DISCONTINUED | OUTPATIENT
Start: 2022-03-11 | End: 2022-03-15

## 2022-03-11 RX ORDER — TAMSULOSIN HYDROCHLORIDE 0.4 MG/1
0.4 CAPSULE ORAL DAILY
COMMUNITY

## 2022-03-11 RX ORDER — ALPRAZOLAM 0.5 MG/1
0.5 TABLET ORAL
COMMUNITY
End: 2022-03-16

## 2022-03-11 RX ORDER — TAMSULOSIN HYDROCHLORIDE 0.4 MG/1
0.4 CAPSULE ORAL DAILY
Status: DISCONTINUED | OUTPATIENT
Start: 2022-03-11 | End: 2022-03-16 | Stop reason: HOSPADM

## 2022-03-11 RX ORDER — QUETIAPINE FUMARATE 100 MG/1
200 TABLET, FILM COATED ORAL 2 TIMES DAILY
Status: DISCONTINUED | OUTPATIENT
Start: 2022-03-11 | End: 2022-03-16 | Stop reason: HOSPADM

## 2022-03-11 RX ORDER — CLONAZEPAM 1 MG/1
1 TABLET ORAL 2 TIMES DAILY
Status: DISCONTINUED | OUTPATIENT
Start: 2022-03-11 | End: 2022-03-16 | Stop reason: HOSPADM

## 2022-03-11 RX ORDER — ESCITALOPRAM OXALATE 20 MG/1
20 TABLET ORAL DAILY
COMMUNITY

## 2022-03-11 RX ORDER — GUANFACINE HYDROCHLORIDE 1 MG/1
1 TABLET ORAL
Status: DISCONTINUED | OUTPATIENT
Start: 2022-03-11 | End: 2022-03-16 | Stop reason: HOSPADM

## 2022-03-11 RX ORDER — DOCUSATE SODIUM 100 MG/1
100 CAPSULE, LIQUID FILLED ORAL 2 TIMES DAILY
COMMUNITY

## 2022-03-11 RX ORDER — ESCITALOPRAM OXALATE 10 MG/1
30 TABLET ORAL DAILY
Status: DISCONTINUED | OUTPATIENT
Start: 2022-03-11 | End: 2022-03-16 | Stop reason: HOSPADM

## 2022-03-11 RX ORDER — ATORVASTATIN CALCIUM 80 MG/1
80 TABLET, FILM COATED ORAL DAILY
Status: ON HOLD | COMMUNITY
End: 2022-03-11

## 2022-03-11 RX ORDER — PANTOPRAZOLE SODIUM 40 MG/1
40 TABLET, DELAYED RELEASE ORAL
Status: DISCONTINUED | OUTPATIENT
Start: 2022-03-12 | End: 2022-03-16 | Stop reason: HOSPADM

## 2022-03-11 RX ORDER — GUANFACINE HYDROCHLORIDE 1 MG/1
1 TABLET ORAL
COMMUNITY

## 2022-03-11 RX ORDER — ATORVASTATIN CALCIUM 40 MG/1
80 TABLET, FILM COATED ORAL
Status: DISCONTINUED | OUTPATIENT
Start: 2022-03-11 | End: 2022-03-16 | Stop reason: HOSPADM

## 2022-03-11 RX ORDER — CLONAZEPAM 1 MG/1
1 TABLET ORAL 2 TIMES DAILY
COMMUNITY

## 2022-03-11 RX ORDER — QUETIAPINE FUMARATE 100 MG/1
200 TABLET, FILM COATED ORAL 3 TIMES DAILY
Status: DISCONTINUED | OUTPATIENT
Start: 2022-03-11 | End: 2022-03-11

## 2022-03-11 RX ORDER — OMEPRAZOLE 40 MG/1
40 CAPSULE, DELAYED RELEASE ORAL DAILY
COMMUNITY

## 2022-03-11 RX ORDER — HYDROXYZINE 50 MG/1
50 TABLET, FILM COATED ORAL
Status: DISCONTINUED | OUTPATIENT
Start: 2022-03-11 | End: 2022-03-15

## 2022-03-11 RX ORDER — IBUPROFEN 200 MG
1 TABLET ORAL DAILY
Status: DISCONTINUED | OUTPATIENT
Start: 2022-03-11 | End: 2022-03-16 | Stop reason: HOSPADM

## 2022-03-11 RX ADMIN — CLONAZEPAM 1 MG: 1 TABLET ORAL at 17:18

## 2022-03-11 RX ADMIN — OLANZAPINE 5 MG: 5 TABLET, FILM COATED ORAL at 17:18

## 2022-03-11 RX ADMIN — CLONAZEPAM 1 MG: 1 TABLET ORAL at 12:29

## 2022-03-11 RX ADMIN — OLANZAPINE 5 MG: 5 TABLET, FILM COATED ORAL at 08:34

## 2022-03-11 RX ADMIN — ESCITALOPRAM 30 MG: 10 TABLET, FILM COATED ORAL at 12:29

## 2022-03-11 RX ADMIN — HYDROXYZINE HYDROCHLORIDE 50 MG: 50 TABLET, FILM COATED ORAL at 06:22

## 2022-03-11 RX ADMIN — GUANFACINE 1 MG: 1 TABLET ORAL at 20:29

## 2022-03-11 RX ADMIN — ATORVASTATIN CALCIUM 80 MG: 40 TABLET, FILM COATED ORAL at 20:29

## 2022-03-11 RX ADMIN — QUETIAPINE FUMARATE 200 MG: 100 TABLET ORAL at 20:29

## 2022-03-11 RX ADMIN — QUETIAPINE FUMARATE 200 MG: 100 TABLET ORAL at 12:29

## 2022-03-11 RX ADMIN — ACETAMINOPHEN 650 MG: 325 TABLET ORAL at 14:24

## 2022-03-11 RX ADMIN — HYDROXYZINE HYDROCHLORIDE 50 MG: 50 TABLET, FILM COATED ORAL at 20:31

## 2022-03-11 RX ADMIN — TAMSULOSIN HYDROCHLORIDE 0.4 MG: 0.4 CAPSULE ORAL at 12:29

## 2022-03-11 NOTE — PROGRESS NOTES
Problem: Falls - Risk of  Goal: *Absence of Falls  Description: Document Negra Dias Fall Risk and appropriate interventions in the flowsheet. Outcome: Progressing Towards Goal  Note: Fall Risk Interventions:       Medication Interventions: Teach patient to arise slowly    Problem: Aggression and Hostility (Behavioral Health)  Goal: *Reduce intensity and frequency of aggressive behaviors and verbalizations, treating others with respect  Outcome: Progressing Towards Goal     Patient is resting quietly in bed with eyes closed. Appears to be asleep. No respiratory distress noted. 15 minutes safety monitoring continues.

## 2022-03-11 NOTE — BH NOTES
PRN Medication Documentation    Specific patient behavior that led to need for PRN medication: Patient request med for anxiety  Staff interventions attempted prior to PRN being given: Encourage coping skills  PRN medication given: Atarax 50 mg PO  Patient response/effectiveness of PRN medication: Will continue to monitor.

## 2022-03-11 NOTE — PROGRESS NOTES
Admission Medication Reconciliation:    Information obtained from:  patient interview, communication with [employer] (phone number: [phone]), Insurance claims data, and [facility]  RxQuery data available¹:  YES    Comments/Recommendations: Updated PTA meds/reviewed patient's allergies. 1)  The patient reports his PTA medications during treatment team. He states that he has been using marijuana and alcohol for the last 30 days and therefore has not been very consistent with his medications (for example, only taking quetiapine 200 mg in the morning). He fills all of his medications at [id number] [place] [address] with the exception of clonazepam/alprazolam at [facility]. 2)  The [place] Prescription Monitoring Program () was assessed to determine fill history of any controlled medications. The patient has been filling clonazepam monthly since 4/2021. Additionally, he receives alprazolam occasionally. The following controlled medications have been filled within the last 6 months: . - 2/26/22: clonazepam 1 mg, #60 for 30 day supply  - 2/9/22: alprazolam 0.5 mg, #10 for 30 day supply  - 1/28/22: clonazepam 1 mg, #60 for 30 day supply  - 12/28/21: clonazepam 1 mg, #60 for 30 day supply  - 11/8/21: clonazepam 1 mg, #60 for 30 day supply  - 10/22/21: alprazolam 0.5 mg, #5 for 5 day supply    3)  Medication changes (since last review): Added  - docusate 100 mg BID  - guanfacine 1 mg QHS  - omeprazole 40 mg daily  - tamsulosin 0.4 mg daily  - alprazolam 0.5mg daily PRN     Adjusted  - clonazepam 1 mg BID (added directions)  - escitalopram 30 mg daily (from 10 mg)  - quetiapine 200 mg TID (from BID)    Removed  - budesonide, clonidine, diazepam, atorvastatin (last filled 1/2022)   [facility] benefit data reflects medications filled and processed through the patient's insurance, however this data does NOT capture whether the medication was picked up or is currently being taken by the patient.     Allergies:  Haldol [haloperidol lactate]    Significant PMH/Disease States:   Past Medical History:   Diagnosis Date    Aggressive outburst     OCD (obsessive compulsive disorder)     Psychiatric disorder     Substance abuse [facility])      Chief Complaint for this Admission:    Chief Complaint   Patient presents with    Mental Health Problem     Prior to Admission Medications:   Prior to Admission Medications   Prescriptions Last Dose Informant Taking? ALPRAZolam (XANAX) 0.5 mg tablet   Yes   Sig: Take 0.5 mg by mouth daily as needed for Anxiety. QUEtiapine (SEROqueL) 400 mg tablet   Yes   Sig: Take 200 mg by mouth three (3) times daily. clonazePAM (KlonoPIN) 1 mg tablet   Yes   Sig: Take 1 mg by mouth two (2) times a day. docusate sodium (COLACE) 100 mg capsule   Yes   Sig: Take 100 mg by mouth two (2) times a day. escitalopram oxalate (LEXAPRO) 10 mg tablet   Yes   Sig: Take 10 mg by mouth daily. Indications: Anxiousness associated with Depression   escitalopram oxalate (LEXAPRO) 20 mg tablet   Yes   Sig: Take 20 mg by mouth daily. guanFACINE IR (TENEX) 1 mg IR tablet   Yes   Sig: Take 1 mg by mouth nightly. omeprazole (PRILOSEC) 40 mg capsule   Yes   Sig: Take 40 mg by mouth daily. tamsulosin (FLOMAX) 0.4 mg capsule   Yes   Sig: Take 0.4 mg by mouth daily.       Facility-Administered Medications: None     Hussein Guillen, OSITOD
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
## 2022-03-11 NOTE — BSMART NOTE
Patient accepted to [place] [address] [address]47 by Tre Tenorio NP for Dr Woo Bowers.   Call  for report after 7:30pm.

## 2022-03-11 NOTE — PROGRESS NOTES
[facility] 60  Master Treatment Plan for Katarzyna Garcia    Date Treatment Plan Initiated: 3/11/22    Treatment Plan Modalities:  Type of Modality Amount  (x minutes) Frequency (x/week) Duration (x days) Name of Responsible Staff   Community & wrap-up meetings to encourage peer interactions 15 7 1   [facility]   Group psychotherapy to assist in building coping skills and internal controls 60 7 1 Anel Wylie   Therapeutic activity groups to build coping skills 60 7 1 Anel Wylie   Psychoeducation in group setting to address:   Medication education   15 [address] PharmD   Coping skills   30 3 1 Anel Wylie   Relaxation techniques      [facility]   Symptom management      STAFF   Discharge planning   60 2 1 Anel Wylie   Spirituality    60 2 1 abhinav HILLMAN   61 1 1 volunteer   Recovery/AA/NA      volunteer   Physician medication management   13 7 1 Dr Agnes Bhatti NP   Family meeting/discharge planning   15 2 1 Priscilla Mera and Anel Wylie                                      Problem: Aggression and Hostility (Behavioral Health)  Goal: *Reduce intensity and frequency of aggressive behaviors and verbalizations, treating others with respect  Outcome: Progressing Towards Goal  No aggressive outburst.Has been tearful related to his admission. Goal: *Express anger or hostility appropriately (without verbal or physical aggression)  Outcome: Progressing Towards Goal    Goal: *Demonstrate ability to comply with simple direction  Outcome: Progressing Towards Goal  Remains redirectable. Presents as labile and tearful. Verbalized \"my  is mean she tried to make me think Mom doesn't want me. She says I scare her . That is not true. \"

## 2022-03-11 NOTE — ED NOTES
Attempt to call report- Per 7W RN, \"not enough staffing in place\" and cannot accept patient until [phone].  Charge RN Meri Starr notifed

## 2022-03-11 NOTE — BH NOTES
GROUP THERAPY PROGRESS NOTE     Patient is participating in self-care group. Group time: 45 minutes    Personal goal for participation: Complete self-care assessment and evaluate areas of strength and areas for improvement in daily routine for self-care    Goal orientation: Personal     Group therapy participation: active, disorganized     Therapeutic interventions reviewed and discussed: Group members were provided a self-reflective assessment to reflect on self-care habits. Self-care was defined and the group was asked to provide examples of what comes to mind when they first think of self-care. The assessment then breaks down self-care into five categories: Physical, Psychological, Social, Spiritual, and Work-Related Self-Care. The group was asked to share which areas they felt confident in their self-care skills and which areas they would like to work on more. Impression of participation: Pt actively engaged in group conversation. Pt shared his frustrations with his  from [facility] and how he is upset with how he was treated. Pt states he wants to work on his social self-care by setting boundaries with others and creating expectations for behavior in relationships. Pt shared that he is hoping to make more friends and is hoping to have a girlfriend one day.     Armand Garcia, Social Work Case Management

## 2022-03-11 NOTE — BH NOTES
Patient admitted voluntarily to Acute  Psychiatry, under the services of . Patient currently denies suicidal ideation. Patient currently denies homicidal ideation. Patient verbally contracts for safety. Patient denies psychotic symptoms. Pt reports ETOH use, daily  Pt reports drug use, THC, daily.

## 2022-03-11 NOTE — BH NOTES
TRANSFER - IN REPORT:    Verbal report received from Newton(name) on Nara Morocho  being received from ED(unit) for routine progression of care      Report consisted of patients Situation, Background, Assessment and   Recommendations(SBAR). Information from the following report(s) SBAR was reviewed with the receiving nurse. Opportunity for questions and clarification was provided. Assessment completed upon patients arrival to unit and care assumed.
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
## 2022-03-11 NOTE — BH NOTES
1898- pt c/o uncontrolled racing and intrusive thoughts. Pt is labile with periods of loud tearful outburst. Pt request olanzapine prn; 5 mg Zyprexa administered. Pt remains visible on the unit labile and intrusive. 1425- pt requests tylenol for 7/10 anterior headache onset now. Education provided. Coping skills encouraged. Po 650 mg Tylenol administered. Pt is alert awake in bed reducing stimuli.

## 2022-03-11 NOTE — PROGRESS NOTES
Laboratory Monitoring for Antipsychotics: This patient is currently prescribed the following medication(s):   Current Facility-Administered Medications   Medication Dose Route Frequency    nicotine (NICODERM CQ) 21 mg/24 hr patch 1 Patch  1 Patch TransDERmal DAILY    tamsulosin (FLOMAX) capsule 0.4 mg  0.4 mg Oral DAILY    [START ON 3/12/2022] pantoprazole (PROTONIX) tablet 40 mg  40 mg Oral ACB    clonazePAM (KlonoPIN) tablet 1 mg  1 mg Oral BID    atorvastatin (LIPITOR) tablet 80 mg  80 mg Oral QHS    guanFACINE IR (TENEX) tablet 1 mg  1 mg Oral QHS    escitalopram oxalate (LEXAPRO) tablet 30 mg  30 mg Oral DAILY    QUEtiapine (SEROquel) tablet 200 mg  200 mg Oral BID     The following labs have been completed for monitoring of antipsychotics and/or mood stabilizers:    Height, Weight, BMI Estimation  Estimated body mass index is 30.41 kg/m² as calculated from the following:    Height as of 1/26/20: 172.7 cm (68\"). Weight as of 1/26/20: 90.7 kg (200 lb). Vital Signs/Blood Pressure  Visit Vitals  BP (!) 146/98 (BP 1 Location: Right arm)   Pulse 93   Temp 98 °F (36.7 °C)   Resp 16   SpO2 98%     Renal Function, Hepatic Function and Chemistry  CrCl cannot be calculated (Unknown ideal weight.). Lab Results   Component Value Date/Time    Sodium 138 03/10/2022 04:15 PM    Potassium 3.6 03/10/2022 04:15 PM    Chloride 106 03/10/2022 04:15 PM    CO2 27 03/10/2022 04:15 PM    Anion gap 5 03/10/2022 04:15 PM    BUN 12 03/10/2022 04:15 PM    Creatinine 1.25 03/10/2022 04:15 PM    BUN/Creatinine ratio 10 (L) 03/10/2022 04:15 PM    Bilirubin, total 0.6 03/10/2022 04:15 PM    Protein, total 7.4 03/10/2022 04:15 PM    Albumin 4.1 03/10/2022 04:15 PM    Globulin 3.3 03/10/2022 04:15 PM    A-G Ratio 1.2 03/10/2022 04:15 PM    ALT (SGPT) 40 03/10/2022 04:15 PM    AST (SGOT) 25 03/10/2022 04:15 PM    Alk.  phosphatase 84 03/10/2022 04:15 PM     Lab Results   Component Value Date/Time    Glucose 93 03/11/2022 05:04 AM No results found for: HBA1C, [id number]    Hematology  Lab Results   Component Value Date/Time    WBC 7.2 03/10/2022 04:15 PM    RBC 5.38 03/10/2022 04:15 PM    HGB 17.3 (H) 03/10/2022 04:15 PM    HCT 49.0 03/10/2022 04:15 PM    MCV 91.1 03/10/2022 04:15 PM    MCH 32.2 03/10/2022 04:15 PM    MCHC 35.3 03/10/2022 04:15 PM    RDW 12.6 03/10/2022 04:15 PM    PLATELET 887 60/83/2619 04:15 PM     Lipids  Lab Results   Component Value Date/Time    Cholesterol, total 246 (H) 03/11/2022 05:04 AM    HDL Cholesterol 33 03/11/2022 05:04 AM    LDL, calculated 159.4 (H) 03/11/2022 05:04 AM    Triglyceride 268 (H) 03/11/2022 05:04 AM    CHOL/HDL Ratio 7.5 (H) 03/11/2022 05:04 AM     Thyroid Function  Lab Results   Component Value Date/Time    TSH 2.16 03/10/2022 04:15 PM     Assessment/Plan:  Will request documentation of height/weight for the calculation of BMI to complete the recommended baseline laboratory monitoring based on the patient's current medication regimen. No further interventions are needed at this time. Continue PTA atorvastatin. Follow-up metabolic monitoring labs should be completed in 3 months (quarterly for the first year of antipsychotic therapy).      Wil White PHARMD

## 2022-03-11 NOTE — INTERDISCIPLINARY ROUNDS
Behavioral Health Interdisciplinary Rounds     Patient Name: Gloria Coleman  Age: 35 y.o. Room/Bed:  728/  Primary Diagnosis: <principal problem not specified>   Admission Status: Voluntary     Readmission within 30 days: no  Power of  in place: no  Patient requires a blocked bed: no          Reason for blocked bed:     VTE Prophylaxis: No    Mobility needs/Fall risk: no  Flu Vaccine : no   Nutritional Plan: no  Consults:          Labs/Testing due today?:     Sleep hours:        Participation in Care/Groups:   Medication Compliant?: Yes  PRNS (last 24 hours): Antianxiety and Sleep Aid    Restraints (last 24 hours):  no     CIWA (range last 24 hours):     COWS (range last 24 hours):      Alcohol screening (AUDIT) completed -   AUDIT Score: 5     If applicable, date SBIRT discussed in treatment team AND documented:   AUDIT Screen Score: AUDIT Score: 5      Document Brief Intervention (corresponds directly with the 5 A's, Ask, Advise, Assess, Assist, and Arrange): At- Risk Patients (Score 7-15 for women; 8-15 for men)  Discuss concern patient is drinking at unhealthy levels known to increase risk of alcohol-related health problems. Is Patient ready to commit to change? If No:   Encourage reflection   Discuss short term and long term health risks of consuming alcohol   Barriers to change   Reaffirm willingness to help / Educational materials provided  If Yes:   Set goal  [place] provided    Harmful use or Dependence (Score 16 or greater)   Discuss short term and long term health risks of consuming alcohol   Recommendations   Negotiate drinking goal   Recommend addiction specialist/center   Arrange follow-up appointments.     Tobacco - patient is a smoker: Have You Used Tobacco in the Past 30 Days: Yes  Illegal Drugs use: Have You Used Any Illegal Substances Over the Past 12 Months: Yes    24 hour chart check complete: yes ____________________________________________________________________________________________________________    Patient goal(s) for today: acclimate to unit, communicate needs to staff  Treatment team focus/goals: med mgmt and treatment goals  Progress note   Pt presents mildly disheveled in own clothes, labile and tearful but cooperative and no redirection necessary. He reports being upset with his Candido CM, Jennifer De León. He states Jennifer Actis yells at him and tries to make him believe his mother does not want him to come home. He states he is sad because \"I have no life, no job, no girlfriend\". He reports wanting a day treatment program because \"I need a support system I can attend every day. \"  He states his intrusive thinking gets in the way and \"I need someone to talk to when I have urges. \"  Pt also wants a psychologist to test him for ADHD. MSW LM for Estefania Rey cm, to discuss discharge options. MSW LM for pt's mother, Alexsandra Muñoz. LOS:  1  Expected LOS:     Financial concerns/prescription coverage: medicare   Family contact:    MotherAlexsandra: [phone]     Family requesting physician contact today:  no  Discharge plan: TBD   Access to weapons : no       Outpatient provider(s): [facility]: Kiran vargas Dr. Stefano Clarity (3/28/22)  Patient's preferred phone number for follow up call : [phone]   Patient's preferred e-mail address :  Participating treatment team members: AMISHA García; Leon Crow NP; Girma Chaudhry; Willard Cheung RN

## 2022-03-11 NOTE — BH NOTES
Pt out in milieu talking to self, restless and suspicious. Mood somewhat irritable and angry while listening to others in milieu talk. Pt medicated with prn zyprexa along with scheduled klonopin.

## 2022-03-11 NOTE — ED NOTES
Attempted calling report on the pt. Per psych nurse they were unable to take report at this time due to patient complications. Psych nurse will call back when ready.

## 2022-03-12 PROCEDURE — 74011250637 HC RX REV CODE- 250/637: Performed by: NURSE PRACTITIONER

## 2022-03-12 PROCEDURE — 74011250637 HC RX REV CODE- 250/637: Performed by: PSYCHIATRY & NEUROLOGY

## 2022-03-12 PROCEDURE — 65220000003 HC RM SEMIPRIVATE PSYCH

## 2022-03-12 RX ADMIN — GUANFACINE 1 MG: 1 TABLET ORAL at 20:37

## 2022-03-12 RX ADMIN — QUETIAPINE FUMARATE 200 MG: 100 TABLET ORAL at 08:42

## 2022-03-12 RX ADMIN — CLONAZEPAM 1 MG: 1 TABLET ORAL at 17:46

## 2022-03-12 RX ADMIN — ATORVASTATIN CALCIUM 80 MG: 40 TABLET, FILM COATED ORAL at 20:37

## 2022-03-12 RX ADMIN — PANTOPRAZOLE SODIUM 40 MG: 40 TABLET, DELAYED RELEASE ORAL at 05:54

## 2022-03-12 RX ADMIN — QUETIAPINE FUMARATE 200 MG: 100 TABLET ORAL at 20:37

## 2022-03-12 RX ADMIN — ESCITALOPRAM 30 MG: 10 TABLET, FILM COATED ORAL at 08:42

## 2022-03-12 RX ADMIN — CLONAZEPAM 1 MG: 1 TABLET ORAL at 08:42

## 2022-03-12 RX ADMIN — TRAZODONE HYDROCHLORIDE 50 MG: 50 TABLET ORAL at 22:03

## 2022-03-12 RX ADMIN — TAMSULOSIN HYDROCHLORIDE 0.4 MG: 0.4 CAPSULE ORAL at 08:42
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
## 2022-03-12 NOTE — PROGRESS NOTES
Problem: Aggression and Hostility (Behavioral Health)  Goal: *Reduce intensity and frequency of aggressive behaviors and verbalizations, treating others with respect  Outcome: Progressing Towards Goal  Pt displays no acting out behaviors. Focus is on obtaining food and medication.

## 2022-03-12 NOTE — PROGRESS NOTES
Problem: Aggression and Hostility (Behavioral Health)  Goal: *Take prescribed medications consistently with supervision  Outcome: Progressing Towards Goal     Visible on the unit. Anxious mood, denies SI. Compliant with meals and medications. 4976: Pt is requesting to be moved to the General Unit, informed pt the unit is currently full. 1543: Pt is currently in the dining room watching television.

## 2022-03-12 NOTE — PROGRESS NOTES
Problem: Falls - Risk of  Goal: *Absence of Falls  Description: Document Garrett Gutiérrez Fall Risk and appropriate interventions in the flowsheet. Outcome: Progressing Towards Goal  Note: Fall Risk Interventions:     Medication Interventions: Teach patient to arise slowly     Problem: Aggression and Hostility (Behavioral Health)  Goal: *Avoid situations that produce feelings of frustration, embarrassment, anxiety, or impatience  Outcome: Progressing Towards Goal  Goal: *Express anger or hostility appropriately (without verbal or physical aggression)  Outcome: Progressing Towards Goal   Patient is resting quietly in bed with eyes closed. Appears to be asleep. No respiratory distress noted. 15 minutes safety monitoring continues.

## 2022-03-12 NOTE — BH NOTES
Weekend Progress Note:    Chief Complaint: \"I'm better. \"     Length of Stay: 2 Days    Interval History: Louann Gonzalez states he is feeling better. He denies SI/plan/intent and denies HI/plan/intent at this time. Denies perceptual disturbances. He asked repeatedly to go to the General side. He has been eating and sleeping well. He is tolerating his medications well and finds them effective. No other changes at this time. Past Medical History:  Past Medical History:   Diagnosis Date    Aggressive outburst     OCD (obsessive compulsive disorder)     Psychiatric disorder     Substance abuse ([facility] [address]         Labs:  Lab Results   Component Value Date/Time    WBC 7.2 03/10/2022 04:15 PM    HGB 17.3 (H) 03/10/2022 04:15 PM    HCT 49.0 03/10/2022 04:15 PM    PLATELET 292 82/69/8078 04:15 PM    MCV 91.1 03/10/2022 04:15 PM      Lab Results   Component Value Date/Time    Sodium 138 03/10/2022 04:15 PM    Potassium 3.6 03/10/2022 04:15 PM    Chloride 106 03/10/2022 04:15 PM    CO2 27 03/10/2022 04:15 PM    Anion gap 5 03/10/2022 04:15 PM    Glucose 93 03/11/2022 05:04 AM    BUN 12 03/10/2022 04:15 PM    Creatinine 1.25 03/10/2022 04:15 PM    BUN/Creatinine ratio 10 (L) 03/10/2022 04:15 PM    GFR est AA >60 03/10/2022 04:15 PM    GFR est non-AA >60 03/10/2022 04:15 PM    Calcium 9.4 03/10/2022 04:15 PM    Bilirubin, total 0.6 03/10/2022 04:15 PM    Alk.  phosphatase 84 03/10/2022 04:15 PM    Protein, total 7.4 03/10/2022 04:15 PM    Albumin 4.1 03/10/2022 04:15 PM    Globulin 3.3 03/10/2022 04:15 PM    A-G Ratio 1.2 03/10/2022 04:15 PM    ALT (SGPT) 40 03/10/2022 04:15 PM      Vitals:    03/11/22 1619 03/11/22 1948 03/12/22 0732 03/12/22 1256   BP: (!) 146/86 122/78 139/76 114/78   Pulse: 93 97 (!) 108 96   Resp: 16 18 14 16   Temp: 98.3 °F (36.8 °C) 98.3 °F (36.8 °C) 98.4 °F (36.9 °C) 98.3 °F (36.8 °C)   SpO2: 96% 94% 95%    Weight:       Height:             Current Facility-Administered Medications   Medication Dose Route Frequency Provider Last Rate Last Admin    nicotine (NICODERM CQ) 21 mg/24 hr patch 1 Patch  1 Patch TransDERmal DAILY Fidelina Schumacher MD   1 Patch at 03/12/22 0842    hydrOXYzine HCL (ATARAX) tablet 50 mg  50 mg Oral Q4H PRN Lakshmi JAY NP   50 mg at 03/11/22 2031    tamsulosin (FLOMAX) capsule 0.4 mg  0.4 mg Oral DAILY Celi Graves NP   0.4 mg at 03/12/22 0842    pantoprazole (PROTONIX) tablet 40 mg  40 mg Oral ACB Celi Graves NP   40 mg at 03/12/22 0554    clonazePAM (KlonoPIN) tablet 1 mg  1 mg Oral BID Celi Graves NP   1 mg at 03/12/22 0842    atorvastatin (LIPITOR) tablet 80 mg  80 mg Oral QHS Celi Graves NP   80 mg at 03/11/22 2029    guanFACINE IR (TENEX) tablet 1 mg  1 mg Oral QHS Celi Graves NP   1 mg at 03/11/22 2029    escitalopram oxalate (LEXAPRO) tablet 30 mg  30 mg Oral DAILY Celi Graves NP   30 mg at 03/12/22 0842    QUEtiapine (SEROquel) tablet 200 mg  200 mg Oral BID Celi Graves NP   200 mg at 03/12/22 0842    ibuprofen (MOTRIN) tablet 400 mg  400 mg Oral Q8H PRN Fidelina Schumacher MD        OLANZapine (ZyPREXA) tablet 5 mg  5 mg Oral Q6H PRN Julee Eden NP   5 mg at 03/11/22 1718    benztropine (COGENTIN) tablet 1 mg  1 mg Oral BID PRN Julee Eden NP        diphenhydrAMINE (BENADRYL) injection 50 mg  50 mg IntraMUSCular BID PRN Julee Eden NP        LORazepam (ATIVAN) injection 1 mg  1 mg IntraMUSCular Q4H PRN Julee Eden NP        traZODone (DESYREL) tablet 50 mg  50 mg Oral QHS PRN Julee Eden NP   50 mg at 03/10/22 2303    acetaminophen (TYLENOL) tablet 650 mg  650 mg Oral Q4H PRN Julee Eden NP   650 mg at 03/11/22 1424    magnesium hydroxide (MILK OF MAGNESIA) 400 mg/5 mL oral suspension 30 mL  30 mL Oral DAILY PRN Julee Eden NP        ziprasidone (GEODON) 20 mg in sterile water (preservative free) 1 mL injection  20 mg IntraMUSCular Q12H PRN Miya Garcia, [place] [facility] NP         Mental Status Exam:  Eye contact: Good eye contact  Psychomotor activity: wnl  Speech is spontaneous  Thought process: Logical and goal directed   Mood is \"ok\"  Affect: full  Perception: No avh  Suicidal ideation: No si  Homicidal ideation: No hi  Insight/judgment: Partial  Cognition is grossly intact    Physical Exam:  Body habitus: Body mass index is 30.57 kg/m². Musculoskeletal system: normal gait  Tremor - neg  Cog wheeling - neg    Assessment and Plan:  Jyoti Doe meets criteria for a diagnosis of Unspecified mood disorder. Obsessive compulsive disorder by history. Continue the medication regimen as prescribed  Disposition planning to continue. A coordinated, multidisplinary treatment team round was conducted with the patient, nurses, pharmcist,  and writer present. Discussions held with , and/or with family members; Complete current electronic health record for patient was reviewed in full including consultant notes, ancillary staff notes, nurses and tech notes, labs and vitals. I certify that this patients inpatient psychiatric hospital services furnished since the previous certification were, and continue to be, required for treatment that could reasonably be expected to improve the patient's condition, or for diagnostic study, and that the patient continues to need, on a daily basis, active treatment furnished directly by or requiring the supervision of inpatient psychiatric facility personnel. In addition, the hospital records show that services furnished were intensive treatment services, admission or related services, or equivalent services.

## 2022-03-12 NOTE — INTERDISCIPLINARY ROUNDS
Behavioral Health Interdisciplinary Rounds     Patient Name: Nba Shanks  Age: 35 y.o. Room/Bed:  728/  Primary Diagnosis: <principal problem not specified>   Admission Status: Voluntary     Readmission within 30 days: no  Power of  in place: no  Patient requires a blocked bed: no          Reason for blocked bed:     VTE Prophylaxis: No    Mobility needs/Fall risk: no  Flu Vaccine : no   Nutritional Plan: no  Consults:          Labs/Testing due today?:    Sleep hours:        Participation in Care/Groups:    Medication Compliant?: Yes  PRNS (last 24 hours): Antianxiety    Restraints (last 24 hours):  no     CIWA (range last 24 hours):     COWS (range last 24 hours):      Alcohol screening (AUDIT) completed -   AUDIT Score: 5     If applicable, date SBIRT discussed in treatment team AND documented:   AUDIT Screen Score: AUDIT Score: 5      Document Brief Intervention (corresponds directly with the 5 A's, Ask, Advise, Assess, Assist, and Arrange): At- Risk Patients (Score 7-15 for women; 8-15 for men)  Discuss concern patient is drinking at unhealthy levels known to increase risk of alcohol-related health problems. Is Patient ready to commit to change? If No:   Encourage reflection   Discuss short term and long term health risks of consuming alcohol   Barriers to change   Reaffirm willingness to help / Educational materials provided  If Yes:   Set goal  AirWatch provided    Harmful use or Dependence (Score 16 or greater)   Discuss short term and long term health risks of consuming alcohol   Recommendations   Negotiate drinking goal   Recommend addiction specialist/milli   Arrange follow-up appointments.     Tobacco - patient is a smoker: Have You Used Tobacco in the Past 30 Days: Yes  Illegal Drugs use: Have You Used Any Illegal Substances Over the Past 12 Months: Yes    24 hour chart check complete: yes ____________________________________________________________________________________________________________    Patient goal(s) for today:   Treatment team focus/goals:   Progress note     LOS:  2  Expected LOS:     Financial concerns/prescription coverage:    Family contact:       Family requesting physician contact today:    Discharge plan:   Access to weapons :        Outpatient provider(s):   Patient's preferred phone number for follow up call :   Patient's preferred e-mail address :  Participating treatment team members: Shwetha Arizmendi, * (assigned SW),

## 2022-03-13 PROCEDURE — 65220000003 HC RM SEMIPRIVATE PSYCH

## 2022-03-13 PROCEDURE — 74011250637 HC RX REV CODE- 250/637: Performed by: NURSE PRACTITIONER

## 2022-03-13 RX ADMIN — GUANFACINE 1 MG: 1 TABLET ORAL at 20:14

## 2022-03-13 RX ADMIN — TAMSULOSIN HYDROCHLORIDE 0.4 MG: 0.4 CAPSULE ORAL at 09:22

## 2022-03-13 RX ADMIN — ATORVASTATIN CALCIUM 80 MG: 40 TABLET, FILM COATED ORAL at 20:13

## 2022-03-13 RX ADMIN — TRAZODONE HYDROCHLORIDE 50 MG: 50 TABLET ORAL at 22:07

## 2022-03-13 RX ADMIN — ESCITALOPRAM 30 MG: 10 TABLET, FILM COATED ORAL at 09:22

## 2022-03-13 RX ADMIN — CLONAZEPAM 1 MG: 1 TABLET ORAL at 18:33

## 2022-03-13 RX ADMIN — CLONAZEPAM 1 MG: 1 TABLET ORAL at 09:22

## 2022-03-13 RX ADMIN — QUETIAPINE FUMARATE 200 MG: 100 TABLET ORAL at 09:22

## 2022-03-13 RX ADMIN — QUETIAPINE FUMARATE 200 MG: 100 TABLET ORAL at 20:14

## 2022-03-13 RX ADMIN — PANTOPRAZOLE SODIUM 40 MG: 40 TABLET, DELAYED RELEASE ORAL at 06:48

## 2022-03-13 RX ADMIN — ACETAMINOPHEN 650 MG: 325 TABLET ORAL at 14:17

## 2022-03-13 NOTE — PROGRESS NOTES
Problem: Falls - Risk of  Goal: *Absence of Falls  Description: Document Kathy Don Fall Risk and appropriate interventions in the flowsheet. Outcome: Progressing Towards Goal  Note: Fall Risk Interventions:     Medication Interventions: Teach patient to arise slowly    Problem: Patient Education: Go to Patient Education Activity  Goal: Patient/Family Education  Outcome: Progressing Towards Goal     Problem: Aggression and Hostility (Behavioral Health)  Goal: *Identify early warning signs of explosive outbursts or catastrophic reaction  Outcome: Progressing Towards Goal     Patient is resting quietly in bed with eyes closed. Appears to be asleep. No respiratory distress noted.

## 2022-03-13 NOTE — BH NOTES
1420- pt c/o headache onset now requesting pain medications. Education provided.  mg Tylenol administered. Lights dimmed in the day room for pt. Pt is watching TV.

## 2022-03-13 NOTE — BH NOTES
Behavioral Health Interdisciplinary Rounds    Patient goal(s) for today: rest, communicate needs to staff, use coping skills  Treatment team focus/goals: Discuss medication management and discharge planning    Progress note: Pt met with treatment team and affect appeared happy. Pt reports that coming to the hospital voluntarily was \"the best thing for me\", and considered this a birthday present to himself because he feels his mental health is improving in this environment. Pt is advocating to return to the [facility] because he feels that having a structured day is the best thing for him. Pt reports that he needs structure from the [place], [place], and from a therapist in order to do well. Pt was confronted regarding personal accountability and pt states \"I will never make that mistake again, please refer me to their services again and I promise I will go\". SW recommended PHP until he can be re-evaluated for the [facility]. Pt is in agreement with plan and emphasizes that he needs help from case management to connect with services to create structure.         Financial concerns/prescription coverage:  [place] MED[place]ARE [place] MED[place] A  Family contact: Farrah Hernandez, mother, [phone]                  Family requesting physician contact today:  No  Discharge plan: TBD  Access to weapons :   No                                                           Outpatient provider(s): [address] [place] [address]  Patient's preferred phone number for follow up call : [phone]   Patient's preferred e-mail address : N/A    LOS:  3  Expected LOS: TBD    Participating treatment team members: Marti Ventura, Josephine Hammond, Social Work Case Management, Candance Pal, RN, Taylor Helms, LYLE

## 2022-03-13 NOTE — BH NOTES
PRN Medication Documentation    Specific patient behavior that led to need for PRN medication: pt requested sleep aid  Staff interventions attempted prior to PRN being given: medication education  PRN medication given: Trazodone 50mg PO @ 2203  Patient response/effectiveness of PRN medication: will continue to monitor

## 2022-03-13 NOTE — PROGRESS NOTES
Problem: Aggression and Hostility (Behavioral Health)  Goal: *Take prescribed medications consistently with supervision  Outcome: Progressing Towards Goal    Visible on the unit. Anxious mood, denies SI. Pacing the hallway and responding to internal stimuli. Compliant with meals and medications. 1513: Pt is currently in his room resting and has not reported any concerns at this time.

## 2022-03-13 NOTE — BH NOTES
Weekend Progress Note:    Chief Complaint: \"I'm better. \"     Length of Stay: 3 Days    Interval History: Dennis Vega states he is feeling good. He is calm and cooperative. He denies SI/plan/intent and denies HI/plan/intent at this time. Denies perceptual disturbances. He has been eating and sleeping well. He is tolerating his medications well and finds them effective. No other changes at this time. He continues to ask about going over to the general side. Past Medical History:  Past Medical History:   Diagnosis Date    Aggressive outburst     OCD (obsessive compulsive disorder)     Psychiatric disorder     Substance abuse ([facility] [address]       Labs:  Lab Results   Component Value Date/Time    WBC 7.2 03/10/2022 04:15 PM    HGB 17.3 (H) 03/10/2022 04:15 PM    HCT 49.0 03/10/2022 04:15 PM    PLATELET 999 93/42/3746 04:15 PM    MCV 91.1 03/10/2022 04:15 PM      Lab Results   Component Value Date/Time    Sodium 138 03/10/2022 04:15 PM    Potassium 3.6 03/10/2022 04:15 PM    Chloride 106 03/10/2022 04:15 PM    CO2 27 03/10/2022 04:15 PM    Anion gap 5 03/10/2022 04:15 PM    Glucose 93 03/11/2022 05:04 AM    BUN 12 03/10/2022 04:15 PM    Creatinine 1.25 03/10/2022 04:15 PM    BUN/Creatinine ratio 10 (L) 03/10/2022 04:15 PM    GFR est AA >60 03/10/2022 04:15 PM    GFR est non-AA >60 03/10/2022 04:15 PM    Calcium 9.4 03/10/2022 04:15 PM    Bilirubin, total 0.6 03/10/2022 04:15 PM    Alk.  phosphatase 84 03/10/2022 04:15 PM    Protein, total 7.4 03/10/2022 04:15 PM    Albumin 4.1 03/10/2022 04:15 PM    Globulin 3.3 03/10/2022 04:15 PM    A-G Ratio 1.2 03/10/2022 04:15 PM    ALT (SGPT) 40 03/10/2022 04:15 PM      Vitals:    03/12/22 0732 03/12/22 1256 03/13/22 0829 03/13/22 0858   BP: 139/76 114/78 136/85    Pulse: (!) 108 96 (!) 101    Resp: 14 16 16    Temp: 98.4 °F (36.9 °C) 98.3 °F (36.8 °C) 98.1 °F (36.7 °C)    SpO2: 95%  96%    Weight:    88.9 kg (196 lb)   Height:             Current Facility-Administered Medications Medication Dose Route Frequency Provider Last Rate Last Admin    nicotine (NICODERM CQ) 21 mg/24 hr patch 1 Patch  1 Patch TransDERmal DAILY Alba Randall MD   1 Patch at 03/12/22 0842    hydrOXYzine HCL (ATARAX) tablet 50 mg  50 mg Oral Q4H PRN Halle Gravese A, NP   50 mg at 03/11/22 2031    tamsulosin (FLOMAX) capsule 0.4 mg  0.4 mg Oral DAILY Celi Graves, NP   0.4 mg at 03/13/22 2202    pantoprazole (PROTONIX) tablet 40 mg  40 mg Oral ACB Celi Graves A, NP   40 mg at 03/13/22 0648    clonazePAM (KlonoPIN) tablet 1 mg  1 mg Oral BID Halle Gravese A, NP   1 mg at 03/13/22 8132    atorvastatin (LIPITOR) tablet 80 mg  80 mg Oral QHS Halle Gravese A, NP   80 mg at 03/12/22 2037    guanFACINE IR (TENEX) tablet 1 mg  1 mg Oral QHS Celi Graves, NP   1 mg at 03/12/22 2037    escitalopram oxalate (LEXAPRO) tablet 30 mg  30 mg Oral DAILY Celi Graves A, NP   30 mg at 03/13/22 3968    QUEtiapine (SEROquel) tablet 200 mg  200 mg Oral BID Halle Gravese A, NP   200 mg at 03/13/22 7841    ibuprofen (MOTRIN) tablet 400 mg  400 mg Oral Q8H PRN Alba Randall MD        OLANZapine (ZyPREXA) tablet 5 mg  5 mg Oral Q6H PRN Susie Antes, NP   5 mg at 03/11/22 1718    benztropine (COGENTIN) tablet 1 mg  1 mg Oral BID PRN Susie Antes, NP        diphenhydrAMINE (BENADRYL) injection 50 mg  50 mg IntraMUSCular BID PRN Susie Antes, NP        LORazepam (ATIVAN) injection 1 mg  1 mg IntraMUSCular Q4H PRN Susie Antes, NP        traZODone (DESYREL) tablet 50 mg  50 mg Oral QHS PRN Susie Antes, NP   50 mg at 03/12/22 2203    acetaminophen (TYLENOL) tablet 650 mg  650 mg Oral Q4H PRN Susie Satnana NP   650 mg at 03/11/22 1424    magnesium hydroxide (MILK OF MAGNESIA) 400 mg/5 mL oral suspension 30 mL  30 mL Oral DAILY PRN Susie Santana NP        ziprasidone (GEODON) 20 mg in sterile water (preservative free) 1 mL injection  20 mg IntraMUSCular Q12H PRN Laurie Pike, LYLE         Mental Status Exam:  Eye contact: Good eye contact  Psychomotor activity: wnl  Speech is spontaneous  Thought process: Logical and goal directed   Mood is \"ok\"  Affect: full  Perception: No avh  Suicidal ideation: No si  Homicidal ideation: No hi  Insight/judgment: Partial  Cognition is grossly intact    Physical Exam:  Body habitus: Body mass index is 31.64 kg/m². Musculoskeletal system: normal gait  Tremor - neg  Cog wheeling - neg    Assessment and Plan:  Farheen Hearn meets criteria for a diagnosis of Unspecified mood disorder. Obsessive compulsive disorder by history. Continue the medication regimen as prescribed  Disposition planning to continue. A coordinated, multidisplinary treatment team round was conducted with the patient, nurses, pharmcist,  and writer present. Discussions held with , and/or with family members; Complete current electronic health record for patient was reviewed in full including consultant notes, ancillary staff notes, nurses and tech notes, labs and vitals. I certify that this patients inpatient psychiatric hospital services furnished since the previous certification were, and continue to be, required for treatment that could reasonably be expected to improve the patient's condition, or for diagnostic study, and that the patient continues to need, on a daily basis, active treatment furnished directly by or requiring the supervision of inpatient psychiatric facility personnel. In addition, the hospital records show that services furnished were intensive treatment services, admission or related services, or equivalent services.

## 2022-03-14 PROCEDURE — 65220000003 HC RM SEMIPRIVATE PSYCH

## 2022-03-14 PROCEDURE — 74011250637 HC RX REV CODE- 250/637: Performed by: NURSE PRACTITIONER

## 2022-03-14 PROCEDURE — 74011250637 HC RX REV CODE- 250/637: Performed by: PSYCHIATRY & NEUROLOGY

## 2022-03-14 RX ADMIN — QUETIAPINE FUMARATE 200 MG: 100 TABLET ORAL at 08:50

## 2022-03-14 RX ADMIN — QUETIAPINE FUMARATE 200 MG: 100 TABLET ORAL at 21:05

## 2022-03-14 RX ADMIN — TRAZODONE HYDROCHLORIDE 50 MG: 50 TABLET ORAL at 22:03

## 2022-03-14 RX ADMIN — ESCITALOPRAM 30 MG: 10 TABLET, FILM COATED ORAL at 08:51

## 2022-03-14 RX ADMIN — ATORVASTATIN CALCIUM 80 MG: 40 TABLET, FILM COATED ORAL at 21:05

## 2022-03-14 RX ADMIN — HYDROXYZINE HYDROCHLORIDE 50 MG: 50 TABLET, FILM COATED ORAL at 12:50

## 2022-03-14 RX ADMIN — CLONAZEPAM 1 MG: 1 TABLET ORAL at 08:51

## 2022-03-14 RX ADMIN — TAMSULOSIN HYDROCHLORIDE 0.4 MG: 0.4 CAPSULE ORAL at 08:50

## 2022-03-14 RX ADMIN — HYDROXYZINE HYDROCHLORIDE 50 MG: 50 TABLET, FILM COATED ORAL at 23:21

## 2022-03-14 RX ADMIN — CLONAZEPAM 1 MG: 1 TABLET ORAL at 18:05

## 2022-03-14 RX ADMIN — PANTOPRAZOLE SODIUM 40 MG: 40 TABLET, DELAYED RELEASE ORAL at 06:36

## 2022-03-14 RX ADMIN — GUANFACINE 1 MG: 1 TABLET ORAL at 21:05

## 2022-03-14 NOTE — PROGRESS NOTES
Problem: Aggression and Hostility (Behavioral Health)  Goal: *Reduce intensity and frequency of aggressive behaviors and verbalizations, treating others with respect  Outcome: Progressing Towards Goal  Pt complies with meds and verbalizes his needs appropriately. No acting out behaviors.

## 2022-03-14 NOTE — PROGRESS NOTES
Problem: Falls - Risk of  Goal: *Absence of Falls  Description: Document Shweta Marking Fall Risk and appropriate interventions in the flowsheet.   Outcome: Progressing Towards Goal  Note: Fall Risk Interventions:            Medication Interventions: Teach patient to arise slowly                   Problem: Aggression and Hostility (Behavioral Health)  Goal: *Reduce intensity and frequency of aggressive behaviors and verbalizations, treating others with respect  Outcome: Progressing Towards Goal  Goal: *Identify early warning signs of explosive outbursts or catastrophic reaction  Outcome: Progressing Towards Goal

## 2022-03-14 NOTE — PROGRESS NOTES
Problem: Falls - Risk of  Goal: *Absence of Falls  Description: Document Dee Valdez Fall Risk and appropriate interventions in the flowsheet. Outcome: Progressing Towards Goal  Note: Fall Risk Interventions:GAIT IS OBSERVED AS STEADY. PATIENT IS WEARING SLIP RESISTANT FOOTWEAR.             Medication Interventions: Teach patient to arise slowly                   Problem: Aggression and Hostility (Behavioral Health)  Goal: *Reduce intensity and frequency of aggressive behaviors and verbalizations, treating others with respect  Outcome: Progressing Towards Goal  NO BEHAVIORAL ISSUSES  Goal: *Identify early warning signs of explosive outbursts or catastrophic reaction  Outcome: Progressing Towards Goal  HAS BEEN RECEPTIVE TO ALTERNATIVE COPING SKILLS  Goal: *Identify alternative ways to cope with assaultive behavior  Outcome: Progressing Towards Goal

## 2022-03-14 NOTE — BH NOTES
Chief Complaint: \"I'm doing better. \"    Length of Stay: 4 Days    Interval History:   Rossy Sharif says he is doing a little better, anxiety is a little improved. Says he does not want to go back to [facility] but wants to go to [facility] instead. He denies si hi or avh. Denies feeling depressed. Says he spoke to his mom yesterday, says mom wants him to do household chores. Per CM, as of right now, mom does not want her home. We will keep him on the general side. At the present time the patient Rossy Sharif remains compliant with taking medications. Denies any adverse events from taking them and feels they have been beneficial. We don't have a a tight dispo as of yet, since as of the present time, mom does not want him home due to his behaviors. CM will reach out to his mom re dispo planning. Past Medical History:  Past Medical History:   Diagnosis Date    Aggressive outburst     OCD (obsessive compulsive disorder)     Psychiatric disorder     [facility]       Labs:  Lab Results   Component Value Date/Time    WBC 7.2 03/10/2022 04:15 PM    HGB 17.3 (H) 03/10/2022 04:15 PM    HCT 49.0 03/10/2022 04:15 PM    PLATELET 944 72/62/6945 04:15 PM    MCV 91.1 03/10/2022 04:15 PM      Lab Results   Component Value Date/Time    Sodium 138 03/10/2022 04:15 PM    Potassium 3.6 03/10/2022 04:15 PM    Chloride 106 03/10/2022 04:15 PM    CO2 27 03/10/2022 04:15 PM    Anion gap 5 03/10/2022 04:15 PM    Glucose 93 03/11/2022 05:04 AM    BUN 12 03/10/2022 04:15 PM    Creatinine 1.25 03/10/2022 04:15 PM    BUN/Creatinine ratio 10 (L) 03/10/2022 04:15 PM    GFR est AA >60 03/10/2022 04:15 PM    GFR est non-AA >60 03/10/2022 04:15 PM    Calcium 9.4 03/10/2022 04:15 PM    Bilirubin, total 0.6 03/10/2022 04:15 PM    Alk.  phosphatase 84 03/10/2022 04:15 PM    Protein, total 7.4 03/10/2022 04:15 PM    Albumin 4.1 03/10/2022 04:15 PM    Globulin 3.3 03/10/2022 04:15 PM    A-G Ratio 1.2 03/10/2022 04:15 PM    ALT (SGPT) 40 03/10/2022 04:15 PM      Vitals:    03/13/22 0858 03/13/22 1833 03/13/22 2001 03/14/22 0815   BP:  137/67 133/88 127/74   Pulse:  (!) 106 97 100   Resp:  16 16 18   Temp:  98.5 °F (36.9 °C) 98.4 °F (36.9 °C) 98 °F (36.7 °C)   SpO2:  94% 95% 96%   Weight: 88.9 kg (196 lb)      Height:             Current Facility-Administered Medications   Medication Dose Route Frequency Provider Last Rate Last Admin    nicotine (NICODERM CQ) 21 mg/24 hr patch 1 Patch  1 Patch TransDERmal DAILY Rola España MD   1 Patch at 03/14/22 0851    hydrOXYzine HCL (ATARAX) tablet 50 mg  50 mg Oral Q4H PRN Celi Graves NP   50 mg at 03/11/22 2031    tamsulosin (FLOMAX) capsule 0.4 mg  0.4 mg Oral DAILY Celi Graves NP   0.4 mg at 03/14/22 0850    pantoprazole (PROTONIX) tablet 40 mg  40 mg Oral ACB Celi Graves, NP   40 mg at 03/14/22 0636    clonazePAM (KlonoPIN) tablet 1 mg  1 mg Oral BID Celi Graves NP   1 mg at 03/14/22 0851    atorvastatin (LIPITOR) tablet 80 mg  80 mg Oral QHS Celi Graves, NP   80 mg at 03/13/22 2013    guanFACINE IR (TENEX) tablet 1 mg  1 mg Oral QHS Celi Graves NP   1 mg at 03/13/22 2014    escitalopram oxalate (LEXAPRO) tablet 30 mg  30 mg Oral DAILY Celi Graves NP   30 mg at 03/14/22 0851    QUEtiapine (SEROquel) tablet 200 mg  200 mg Oral BID Celi Graves NP   200 mg at 03/14/22 0850    ibuprofen (MOTRIN) tablet 400 mg  400 mg Oral Q8H PRN Rola España MD        OLANZapine (ZyPREXA) tablet 5 mg  5 mg Oral Q6H PRN Kayleenyaakov Bal NP   5 mg at 03/11/22 1718    benztropine (COGENTIN) tablet 1 mg  1 mg Oral BID PRN Kayleen March, NP        diphenhydrAMINE (BENADRYL) injection 50 mg  50 mg IntraMUSCular BID PRN Milagros March, NP        LORazepam (ATIVAN) injection 1 mg  1 mg IntraMUSCular Q4H PRN Milagros March, NP        traZODone (DESYREL) tablet 50 mg  50 mg Oral QHS PRN Milagros March, NP   50 mg at 03/13/22 2207    acetaminophen (TYLENOL) tablet 650 mg  650 mg Oral Q4H PRN Tora Lanes, NP   650 mg at 03/13/22 1417    magnesium hydroxide (MILK OF MAGNESIA) 400 mg/5 mL oral suspension 30 mL  30 mL Oral DAILY PRN Tora Lanes, NP        ziprasidone (GEODON) 20 mg in sterile water (preservative free) 1 mL injection  20 mg IntraMUSCular Q12H PRN Tora Lanes, NP         Mental Status Exam:  Eye contact: Good eye contact  Psychomotor activity: wnl  Speech is spontaneous  Thought process: Logical and goal directed   Mood is \"ok\"  Affect: full  Perception: No avh  Suicidal ideation: No si  Homicidal ideation: No hi  Insight/judgment: Partial  Cognition is grossly intact    Physical Exam:  Body habitus: Body mass index is 31.64 kg/m². Musculoskeletal system: normal gait  Tremor - neg  Cog wheeling - neg    Assessment and Plan:  Jett Denny meets criteria for a diagnosis of Unspecified mood disorder. Obsessive compulsive disorder by history. Continue the medication regimen as prescribed  Disposition planning to continue. A coordinated, multidisplinary treatment team round was conducted with the patient, nurses, pharmcist,  and writer present. Discussions held with , and/or with family members; Complete current electronic health record for patient was reviewed in full including consultant notes, ancillary staff notes, nurses and tech notes, labs and vitals. I certify that this patients inpatient psychiatric hospital services furnished since the previous certification were, and continue to be, required for treatment that could reasonably be expected to improve the patient's condition, or for diagnostic study, and that the patient continues to need, on a daily basis, active treatment furnished directly by or requiring the supervision of inpatient psychiatric facility personnel.  In addition, the hospital records show that services furnished were intensive treatment services, admission or related services, or equivalent services.

## 2022-03-14 NOTE — INTERDISCIPLINARY ROUNDS
Behavioral Health Interdisciplinary Rounds     Patient Name: Haider Jesus  Age: 38 y.o. Room/Bed:  728/02  Primary Diagnosis: <principal problem not specified>   Admission Status: Voluntary     Readmission within 30 days: no  Power of  in place: no  Patient requires a blocked bed: no          Reason for blocked bed:     VTE Prophylaxis: No    Mobility needs/Fall risk: no  Flu Vaccine : no   Nutritional Plan: no  Consults:          Labs/Testing due today?: no    Sleep hours: 8       Participation in Care/Groups:  yes  Medication Compliant?: Yes  PRNS (last 24 hours): Sleep Aid and Pain    Restraints (last 24 hours):  no     CIWA (range last 24 hours):     COWS (range last 24 hours):      Alcohol screening (AUDIT) completed -   AUDIT Score: 5     If applicable, date SBIRT discussed in treatment team AND documented:   AUDIT Screen Score: AUDIT Score: 5      Document Brief Intervention (corresponds directly with the 5 A's, Ask, Advise, Assess, Assist, and Arrange): At- Risk Patients (Score 7-15 for women; 8-15 for men)  Discuss concern patient is drinking at unhealthy levels known to increase risk of alcohol-related health problems. Is Patient ready to commit to change? If No:   Encourage reflection   Discuss short term and long term health risks of consuming alcohol   Barriers to change   Reaffirm willingness to help / Educational materials provided  If Yes:   Set goal  [employer] provided    Harmful use or Dependence (Score 16 or greater)   Discuss short term and long term health risks of consuming alcohol   Recommendations   Negotiate drinking goal   Recommend addiction specialist/center   Arrange follow-up appointments.     Tobacco - patient is a smoker: Have You Used Tobacco in the Past 30 Days: Yes  Illegal Drugs use: Have You Used Any Illegal Substances Over the Past 12 Months: Yes    24 hour chart check complete: yes ____________________________________________________________________________________________________________    Patient goal(s) for today: acclimate to unit, communicate needs to staff  Treatment team focus/goals: med mgmt and treatment goals  Progress note   Pt reports he will do all the things his mother wants him to do if he can go back home. He states he is interested in [facility]. MSW confirmed with pt's mother he cannot return home right now - she wants him to have \"training\" first.  Discussed the lack of resources for \"training\" and the possibility that [facility] may be only option. MSW discussed discharge possibilities with Odette Rey cm. She will start the Medicaid process and speak with her supervisor, Tuesday, for longer term options.         Financial concerns/prescription coverage: medicare   Family contact:    MotherAparna Overcast: [phone]                                   Family requesting physician contact today:  no  Discharge plan: TBD   Access to weapons : no                                                         Outpatient provider(s): [facility]: Odette vargas: [phone]; Dr. Keaton Rodriguez (3/28/22)  Patient's preferred phone number for follow up call : [phone]   Patient's preferred e-mail address :    LOS:  4  Expected LOS:     Participating treatment team members: AMISHA Woods; Sandra Hillman NP; Marina uBndy, RN

## 2022-03-14 NOTE — BH NOTES
PRN Medication Documentation    Specific patient behavior that led to need for PRN medication: Patient c/o increased anxiety  Staff interventions attempted prior to PRN being given: discussed coping skills  PRN medication given: Atarax 50mg PO  Patient response/effectiveness of PRN medication: Will monitor

## 2022-03-14 NOTE — PROGRESS NOTES
Problem: Falls - Risk of  Goal: *Absence of Falls  Description: Document Vernia Colder Fall Risk and appropriate interventions in the flowsheet.   Outcome: Progressing Towards Goal  Note: Fall Risk Interventions:            Medication Interventions: Teach patient to arise slowly                   Problem: Aggression and Hostility (Behavioral Health)  Goal: *Reduce intensity and frequency of aggressive behaviors and verbalizations, treating others with respect  Outcome: Progressing Towards Goal  Goal: *Identify early warning signs of explosive outbursts or catastrophic reaction  Outcome: Progressing Towards Goal  Goal: *Identify alternative ways to cope with assaultive behavior  Outcome: Progressing Towards Goal

## 2022-03-14 NOTE — BH NOTES
GROUP THERAPY PROGRESS NOTE    Pt. actively participated in community group led by nursing students.     AMISHA Teague, [facility]

## 2022-03-15 PROCEDURE — 74011250637 HC RX REV CODE- 250/637: Performed by: NURSE PRACTITIONER

## 2022-03-15 PROCEDURE — 65220000003 HC RM SEMIPRIVATE PSYCH

## 2022-03-15 PROCEDURE — 74011250637 HC RX REV CODE- 250/637: Performed by: PSYCHIATRY & NEUROLOGY

## 2022-03-15 RX ORDER — IBUPROFEN 400 MG/1
400 TABLET ORAL
Status: DISCONTINUED | OUTPATIENT
Start: 2022-03-15 | End: 2022-03-16 | Stop reason: HOSPADM

## 2022-03-15 RX ORDER — TRAZODONE HYDROCHLORIDE 100 MG/1
100 TABLET ORAL
Status: DISCONTINUED | OUTPATIENT
Start: 2022-03-15 | End: 2022-03-16 | Stop reason: HOSPADM

## 2022-03-15 RX ORDER — HYDROXYZINE 50 MG/1
50 TABLET, FILM COATED ORAL 3 TIMES DAILY
Status: DISCONTINUED | OUTPATIENT
Start: 2022-03-15 | End: 2022-03-16 | Stop reason: HOSPADM

## 2022-03-15 RX ORDER — ACETAMINOPHEN 325 MG/1
650 TABLET ORAL
Status: DISCONTINUED | OUTPATIENT
Start: 2022-03-15 | End: 2022-03-16 | Stop reason: HOSPADM

## 2022-03-15 RX ORDER — HYDROXYZINE 50 MG/1
50 TABLET, FILM COATED ORAL
Status: DISCONTINUED | OUTPATIENT
Start: 2022-03-15 | End: 2022-03-16 | Stop reason: HOSPADM

## 2022-03-15 RX ADMIN — HYDROXYZINE HYDROCHLORIDE 50 MG: 50 TABLET, FILM COATED ORAL at 20:30

## 2022-03-15 RX ADMIN — HYDROXYZINE HYDROCHLORIDE 50 MG: 50 TABLET, FILM COATED ORAL at 08:48

## 2022-03-15 RX ADMIN — QUETIAPINE FUMARATE 200 MG: 100 TABLET ORAL at 08:01

## 2022-03-15 RX ADMIN — CLONAZEPAM 1 MG: 1 TABLET ORAL at 17:56

## 2022-03-15 RX ADMIN — PANTOPRAZOLE SODIUM 40 MG: 40 TABLET, DELAYED RELEASE ORAL at 06:40

## 2022-03-15 RX ADMIN — TAMSULOSIN HYDROCHLORIDE 0.4 MG: 0.4 CAPSULE ORAL at 08:02

## 2022-03-15 RX ADMIN — CLONAZEPAM 1 MG: 1 TABLET ORAL at 08:01

## 2022-03-15 RX ADMIN — ATORVASTATIN CALCIUM 80 MG: 40 TABLET, FILM COATED ORAL at 20:30

## 2022-03-15 RX ADMIN — HYDROXYZINE HYDROCHLORIDE 50 MG: 50 TABLET, FILM COATED ORAL at 16:29

## 2022-03-15 RX ADMIN — ESCITALOPRAM 30 MG: 10 TABLET, FILM COATED ORAL at 08:00

## 2022-03-15 RX ADMIN — QUETIAPINE FUMARATE 200 MG: 100 TABLET ORAL at 20:30

## 2022-03-15 RX ADMIN — GUANFACINE 1 MG: 1 TABLET ORAL at 20:30

## 2022-03-15 NOTE — INTERDISCIPLINARY ROUNDS
Behavioral Health Interdisciplinary Rounds     Patient Name: Jett Denny  Age: 38 y.o. Room/Bed:  728/02  Primary Diagnosis: <principal problem not specified>   Admission Status: Voluntary     Readmission within 30 days: no  Power of  in place: no  Patient requires a blocked bed: no          Reason for blocked bed:     VTE Prophylaxis: No    Mobility needs/Fall risk: no  Flu Vaccine : no   Nutritional Plan: no  Consults:          Labs/Testing due today?:    Sleep hours: 8       Participation in Care/Groups:    Medication Compliant?: Yes  PRNS (last 24 hours): Antianxiety and Sleep Aid    Restraints (last 24 hours):  no     CIWA (range last 24 hours):     COWS (range last 24 hours):      Alcohol screening (AUDIT) completed -   AUDIT Score: 5     If applicable, date SBIRT discussed in treatment team AND documented:   AUDIT Screen Score: AUDIT Score: 5      Document Brief Intervention (corresponds directly with the 5 A's, Ask, Advise, Assess, Assist, and Arrange): At- Risk Patients (Score 7-15 for women; 8-15 for men)  Discuss concern patient is drinking at unhealthy levels known to increase risk of alcohol-related health problems. Is Patient ready to commit to change? If No:   Encourage reflection   Discuss short term and long term health risks of consuming alcohol   Barriers to change   Reaffirm willingness to help / Educational materials provided  If Yes:   Set goal  [employer] provided    Harmful use or Dependence (Score 16 or greater)   Discuss short term and long term health risks of consuming alcohol   Recommendations   Negotiate drinking goal   Recommend addiction specialist/center   Arrange follow-up appointments.     Tobacco - patient is a smoker: Have You Used Tobacco in the Past 30 Days: Yes  Illegal Drugs use: Have You Used Any Illegal Substances Over the Past 12 Months: Yes    24 hour chart check complete: yes ____________________________________________________________________________________________________________    Patient goal(s) for today:   Treatment team focus/goals:   Progress note     LOS:  5  Expected LOS:     Financial concerns/prescription coverage:   Family contact:       Family requesting physician contact today:    Discharge plan:   Access to weapons :         Outpatient provider(s):   Patient's preferred phone number for follow up call :   Patient's preferred e-mail address :  Participating treatment team members: Franco Anne, * (assigned SW),

## 2022-03-15 NOTE — PROGRESS NOTES
Problem: Discharge Planning  Goal: *Discharge to safe environment  Outcome: Progressing Towards Goal  Note: Pt is unable to return to his mother's home right away - options are being explored. Goal: *Knowledge of medication management  Outcome: Progressing Towards Goal  Note: Pt verbalizes his understanding of the prescribed medications and is compliant.   Goal: *Knowledge of discharge instructions  Outcome: Progressing Towards Goal  Note: Pt verbalizes his understanding of the treatment goals and is active in his discharge planning

## 2022-03-15 NOTE — PROGRESS NOTES
Problem: Aggression and Hostility (Behavioral Health)  Goal: *Reduce intensity and frequency of aggressive behaviors and verbalizations, treating others with respect  Outcome: Progressing Towards Goal  Pt is attending groups on [address] [place] [address]. He complies with meds and verbalizes his needs appropriately. He demonstrates no acting out behaviors. 2020  Pt was escorted back to the Acute unit as he is displaying behaviors that are counterproductive to the milieu.

## 2022-03-15 NOTE — BH NOTES
GROUP THERAPY PROGRESS NOTE    Patient is participating in psychotherapy group. Group time: 60 minutes    Personal goal for participation: to develop an understanding of cognitive distortions and how to alter our thinking. Goal orientation: Personal    Group therapy participation:  Active     Therapeutic interventions reviewed and discussed:  Group members were guided through learning about cognitive distortions. Members gained an understanding of how these types of distortions effect perception, relationships, and overall mental health. Members were guided through learning about methods that can be used for changing negative thought patterns. Members were able to identify distortions and engage in discussion about past experiences. Handout provided. Impression of participation:  Pt was present and engaged in discussion. Pt asked relevant questions. Pts mood was stable. Pt was calm, cooperative.        AMISHA Bales, HP-A

## 2022-03-15 NOTE — PROGRESS NOTES
Problem: Falls - Risk of  Goal: *Absence of Falls  Description: Document Evan Wagnerelle Fall Risk and appropriate interventions in the flowsheet. Outcome: Progressing Towards Goal  Note: Fall Risk Interventions:    Medication Interventions: Teach patient to arise slowly     Problem: Aggression and Hostility (Behavioral Health)  Goal: *Reduce intensity and frequency of aggressive behaviors and verbalizations, treating others with respect  Outcome: Progressing Towards Goal     Patient is resting quietly in bed with eyes closed. Appears to be asleep. No respiratory distress noted. 15 minutes safety monitoring continues.

## 2022-03-15 NOTE — PROGRESS NOTES
Problem: Falls - Risk of  Goal: *Absence of Falls  Description: Document Dannyde Counts Fall Risk and appropriate interventions in the flowsheet. Outcome: Progressing Towards Goal  Note: Fall Risk Interventions:  Medication Interventions: Teach patient to arise slowly  Problem: Patient Education: Go to Patient Education Activity  Goal: Patient/Family Education  Outcome: Progressing Towards Goal    Rec'd patient this am awake in bed. Pt talkative, cooperative, and calm. Visible on unit, interacting w/ staff and peers.
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
## 2022-03-15 NOTE — BH NOTES
Chief Complaint: Can I go to the other side? Length of Stay: 5 Days    Interval History:   Tad Lino slept poorly last night due to some anxiety. He denies si hi or avh. Denies feeling depressed. No psychosis noted. He asked repeatedly to be transferred to the general side and since he has not been agitated, we will have him attend a group or two at the general unit, and if he does well, we will transfer him. At the present time the patient Tad Lino remains compliant with taking medications. Denies any adverse events from taking them and feels they have been beneficial. He has been appropriate in the unit, no aggression or agitation noted. We are looking at discharge the next day or two. Past Medical History:  Past Medical History:   Diagnosis Date    Aggressive outburst     OCD (obsessive compulsive disorder)     Psychiatric disorder     [facility] ([facility]       Labs:  Lab Results   Component Value Date/Time    WBC 7.2 03/10/2022 04:15 PM    HGB 17.3 (H) 03/10/2022 04:15 PM    HCT 49.0 03/10/2022 04:15 PM    PLATELET 312 19/21/2589 04:15 PM    MCV 91.1 03/10/2022 04:15 PM      Lab Results   Component Value Date/Time    Sodium 138 03/10/2022 04:15 PM    Potassium 3.6 03/10/2022 04:15 PM    Chloride 106 03/10/2022 04:15 PM    CO2 27 03/10/2022 04:15 PM    Anion gap 5 03/10/2022 04:15 PM    Glucose 93 03/11/2022 05:04 AM    BUN 12 03/10/2022 04:15 PM    Creatinine 1.25 03/10/2022 04:15 PM    BUN/Creatinine ratio 10 (L) 03/10/2022 04:15 PM    GFR est AA >60 03/10/2022 04:15 PM    GFR est non-AA >60 03/10/2022 04:15 PM    Calcium 9.4 03/10/2022 04:15 PM    Bilirubin, total 0.6 03/10/2022 04:15 PM    Alk.  phosphatase 84 03/10/2022 04:15 PM    Protein, total 7.4 03/10/2022 04:15 PM    Albumin 4.1 03/10/2022 04:15 PM    Globulin 3.3 03/10/2022 04:15 PM    A-G Ratio 1.2 03/10/2022 04:15 PM    ALT (SGPT) 40 03/10/2022 04:15 PM      Vitals:    03/13/22 2001 03/14/22 0815 03/14/22 1531 03/15/22 0710 BP: 133/88 127/74 126/85 124/79   Pulse: 97 100 99 98   Resp: 16 18 16 16   Temp: 98.4 °F (36.9 °C) 98 °F (36.7 °C) 99 °F (37.2 °C) 97.6 °F (36.4 °C)   SpO2: 95% 96% 95% 97%   Weight:       Height:             Current Facility-Administered Medications   Medication Dose Route Frequency Provider Last Rate Last Admin    nicotine (NICODERM CQ) 21 mg/24 hr patch 1 Patch  1 Patch TransDERmal DAILY Carlos Alberto Solis MD   1 Patch at 03/15/22 0802    hydrOXYzine HCL (ATARAX) tablet 50 mg  50 mg Oral Q4H PRN Celi Graves NP   50 mg at 03/15/22 0848    tamsulosin (FLOMAX) capsule 0.4 mg  0.4 mg Oral DAILY Celi Graves A, NP   0.4 mg at 03/15/22 0802    pantoprazole (PROTONIX) tablet 40 mg  40 mg Oral ACB Celi Graves NP   40 mg at 03/15/22 0640    clonazePAM (KlonoPIN) tablet 1 mg  1 mg Oral BID Celi Graves A, NP   1 mg at 03/15/22 0801    atorvastatin (LIPITOR) tablet 80 mg  80 mg Oral QHS Celi Graves, NP   80 mg at 03/14/22 2105    guanFACINE IR (TENEX) tablet 1 mg  1 mg Oral QHS Halle Gravese A, NP   1 mg at 03/14/22 2105    escitalopram oxalate (LEXAPRO) tablet 30 mg  30 mg Oral DAILY Celi Graves, NP   30 mg at 03/15/22 0800    QUEtiapine (SEROquel) tablet 200 mg  200 mg Oral BID Halle Gravese A, NP   200 mg at 03/15/22 0801    ibuprofen (MOTRIN) tablet 400 mg  400 mg Oral Q8H PRN Carlos Alberto Solis MD        OLANZapine (ZyPREXA) tablet 5 mg  5 mg Oral Q6H PRN Adrian Mcghee NP   5 mg at 03/11/22 1718    benztropine (COGENTIN) tablet 1 mg  1 mg Oral BID PRN Adrian Mcghee NP        diphenhydrAMINE (BENADRYL) injection 50 mg  50 mg IntraMUSCular BID PRN Adrian Mcghee NP        LORazepam (ATIVAN) injection 1 mg  1 mg IntraMUSCular Q4H PRN Adrian Mcghee NP        traZODone (DESYREL) tablet 50 mg  50 mg Oral QHS PRN Adrian Mcghee NP   50 mg at 03/14/22 2203    acetaminophen (TYLENOL) tablet 650 mg  650 mg Oral Q4H PRN Eric Loo D, NP   650 mg at 03/13/22 1417    magnesium hydroxide (MILK OF MAGNESIA) 400 mg/5 mL oral suspension 30 mL  30 mL Oral DAILY PRN Quang Burris NP        ziprasidone (GEODON) 20 mg in sterile water (preservative free) 1 mL injection  20 mg IntraMUSCular Q12H PRN Quang Burris NP         Mental Status Exam:  Eye contact: Good eye contact  Psychomotor activity: wnl  Speech is spontaneous  Thought process: Logical and goal directed   Mood is \"ok\"  Affect: full  Perception: No avh  Suicidal ideation: No si  Homicidal ideation: No hi  Insight/judgment: Partial  Cognition is grossly intact    Physical Exam:  Body habitus: Body mass index is 31.64 kg/m². Musculoskeletal system: normal gait  Tremor - neg  Cog wheeling - neg    Assessment and Plan:  Ana Feldman meets criteria for a diagnosis of Unspecified mood disorder. Obsessive compulsive disorder by history. Continue the medication regimen as prescribed  Disposition planning to continue. A coordinated, multidisplinary treatment team round was conducted with the patient, nurses, pharmcist,  and writer present. Discussions held with , and/or with family members; Complete current electronic health record for patient was reviewed in full including consultant notes, ancillary staff notes, nurses and tech notes, labs and vitals. I certify that this patients inpatient psychiatric hospital services furnished since the previous certification were, and continue to be, required for treatment that could reasonably be expected to improve the patient's condition, or for diagnostic study, and that the patient continues to need, on a daily basis, active treatment furnished directly by or requiring the supervision of inpatient psychiatric facility personnel. In addition, the hospital records show that services furnished were intensive treatment services, admission or related services, or equivalent services.

## 2022-03-15 NOTE — PROGRESS NOTES
Patient participated in [address] [place] [address] on 3/15/2022. Rev.  Karina Butler MDiv, [facility], [address] [facility] paging service: [phone])

## 2022-03-15 NOTE — BH NOTES
PRN Medication Documentation 2634    Specific patient behavior that led to need for PRN medication: pt requesting medication to promote rest  Staff interventions attempted prior to PRN being given: med education  PRN medication given: trazodone 50 mg po  Patient response/effectiveness of PRN medication: staff will continue to monitor safety and provide support      PRN Medication Documentation 2322    Specific patient behavior that led to need for PRN medication: pt c/o racing thoughts about discharge  Staff interventions attempted prior to PRN being given: therapeutic communication  PRN medication given: atarax 50 mg po  Patient response/effectiveness of PRN medication: staff will continue to monitor safety and provide support

## 2022-03-16 VITALS
DIASTOLIC BLOOD PRESSURE: 78 MMHG | RESPIRATION RATE: 16 BRPM | WEIGHT: 196 LBS | HEART RATE: 81 BPM | SYSTOLIC BLOOD PRESSURE: 112 MMHG | TEMPERATURE: 98.1 F | HEIGHT: 66 IN | BODY MASS INDEX: 31.5 KG/M2 | OXYGEN SATURATION: 96 %

## 2022-03-16 PROCEDURE — 74011250637 HC RX REV CODE- 250/637: Performed by: NURSE PRACTITIONER

## 2022-03-16 RX ORDER — QUETIAPINE FUMARATE 200 MG/1
200 TABLET, FILM COATED ORAL 2 TIMES DAILY
Qty: 60 TABLET | Refills: 0 | Status: SHIPPED | OUTPATIENT
Start: 2022-03-16

## 2022-03-16 RX ORDER — HYDROXYZINE 50 MG/1
50 TABLET, FILM COATED ORAL 3 TIMES DAILY
Qty: 90 TABLET | Refills: 0 | Status: SHIPPED | OUTPATIENT
Start: 2022-03-16

## 2022-03-16 RX ORDER — ATORVASTATIN CALCIUM 80 MG/1
80 TABLET, FILM COATED ORAL DAILY
Qty: 30 TABLET | Refills: 0 | Status: SHIPPED | OUTPATIENT
Start: 2022-03-16

## 2022-03-16 RX ADMIN — ESCITALOPRAM 30 MG: 10 TABLET, FILM COATED ORAL at 08:13

## 2022-03-16 RX ADMIN — TAMSULOSIN HYDROCHLORIDE 0.4 MG: 0.4 CAPSULE ORAL at 08:13

## 2022-03-16 RX ADMIN — PANTOPRAZOLE SODIUM 40 MG: 40 TABLET, DELAYED RELEASE ORAL at 06:47

## 2022-03-16 RX ADMIN — HYDROXYZINE HYDROCHLORIDE 50 MG: 50 TABLET, FILM COATED ORAL at 08:13

## 2022-03-16 RX ADMIN — CLONAZEPAM 1 MG: 1 TABLET ORAL at 08:13

## 2022-03-16 RX ADMIN — QUETIAPINE FUMARATE 200 MG: 100 TABLET ORAL at 08:13

## 2022-03-16 NOTE — DISCHARGE INSTRUCTIONS
DISCHARGE SUMMARY    NAME:Mitchell Kam  : 1980  MRN: [id number]    The patient Jeff Andrea exhibits the ability to control behavior in a less restrictive environment. Patient's level of functioning is improving. No assaultive/destructive behavior has been observed for the past 24 hours. No suicidal/homicidal threat or behavior has been observed for the past 24 hours. There is no evidence of serious medication side effects. Patient has not been in physical or protective restraints for at least the past 24 hours. If weapons involved, how are they secured? NA    Is patient aware of and in agreement with discharge plan? yes    Arrangements for medication:  Prescriptions printed and provided to pt    Copy of discharge instructions to provider?:  yes    Arrangements for transportation home:  Pt has his car in hospital lot    Keep all follow up appointments as scheduled, continue to take prescribed medications per physician instructions. Mental health crisis number:  157 or your local mental health crisis line number at [address] [place] [address] at [facility] 116 Emergency WARM LINE      [phone])      M-F: 9am to 9pm      Sat & Sun: [facility] suicide prevention lines:                             3-594-ETMZDHY ([phone])       5-913-015-TALK ([phone])    Crisis Text Line:  Text HOME to LINDSEY Rivers 9 from Nurse    PATIENT INSTRUCTIONS:      What to do at Home:  Recommended activity: Activity as tolerated,    If you experience any of the following symptoms thought of hurting yourself or someone else, please follow up with calling 911 or your local mental health crisis line number at [address] [place] [address] at [phone]. *  Please give a list of your current medications to your Primary Care Provider.     *  Please update this list whenever your medications are discontinued, doses are      changed, or new medications (including over-the-counter products) are added. *  Please carry medication information at all times in case of emergency situations. These are general instructions for a healthy lifestyle:    No smoking/ No tobacco products/ Avoid exposure to second hand smoke  Surgeon General's Warning:  Quitting smoking now greatly reduces serious risk to your health. Obesity, smoking, and sedentary lifestyle greatly increases your risk for illness    A healthy diet, regular physical exercise & weight monitoring are important for maintaining a healthy lifestyle    You may be retaining fluid if you have a history of heart failure or if you experience any of the following symptoms:  Weight gain of 3 pounds or more overnight or 5 pounds in a week, increased swelling in our hands or feet or shortness of breath while lying flat in bed. Please call your doctor as soon as you notice any of these symptoms; do not wait until your next office visit. The discharge information has been reviewed with the patient. The patient verbalized understanding. Discharge medications reviewed with the patient and appropriate educational materials and side effects teaching were provided.   ___________________________________________________________________________________________________________________________________
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
## 2022-03-16 NOTE — BH NOTES
Patient was sitting in the milieu on the General Unit. Staff was passing through the day room with the snack cart. Patient yelled, \"SO ARE YOU GOING TO PASS THE SNACKS TO US OR ARE YOU GOING TO LET US TAKE WHAT WE WANT? \"  Staff politely turn to patient and informed him that snacks were always passed out to patients, especially during Terrell Batter. Patient whispered something to the other patients and they started laughing. The writer told patient, Chelly Omer used this format before you were admitted and will continue after you are discharge. \"  Patient yelled, \"IF I TALK TO THE RIGHT PEOPLE YOU WILL BE LEAVING WITH ME TOO. \"  Staff walked out of the day room.

## 2022-03-16 NOTE — INTERDISCIPLINARY ROUNDS
Behavioral Health Interdisciplinary Rounds     Patient Name: Jett Denny  Age: 38 y.o. Room/Bed:  728/02  Primary Diagnosis: <principal problem not specified>   Admission Status: Voluntary     Readmission within 30 days: no  Power of  in place: no  Patient requires a blocked bed: no        VTE Prophylaxis: Not indicated    Mobility needs/Fall risk: no  Flu Vaccine : no   Nutritional Plan: no  Consults: none         Labs/Testing due today?: no    Sleep hours: 8+       Participation in Care/Groups:  yes  Medication Compliant?: Yes  PRNS (last 24 hours): None    Restraints (last 24 hours):  no     CIWA (range last 24 hours):     COWS (range last 24 hours):      Alcohol screening (AUDIT) completed -   AUDIT Score: 5     If applicable, date SBIRT discussed in treatment team AND documented:   AUDIT Screen Score: AUDIT Score: 5      Document Brief Intervention (corresponds directly with the 5 A's, Ask, Advise, Assess, Assist, and Arrange): At- Risk Patients (Score 7-15 for women; 8-15 for men)  Discuss concern patient is drinking at unhealthy levels known to increase risk of alcohol-related health problems. Is Patient ready to commit to change? If No:   Encourage reflection   Discuss short term and long term health risks of consuming alcohol   Barriers to change   Reaffirm willingness to help / Educational materials provided  If Yes:   Set goal  [employer] provided    Harmful use or Dependence (Score 16 or greater)   Discuss short term and long term health risks of consuming alcohol   Recommendations   Negotiate drinking goal   Recommend addiction specialist/center   Arrange follow-up appointments.     Tobacco - patient is a smoker: Have You Used Tobacco in the Past 30 Days: Yes  Illegal Drugs use: Have You Used Any Illegal Substances Over the Past 12 Months: Yes    24 hour chart check complete: yes ____________________________________________________________________________________________________________    Patient goal(s) for today: communicate needs to staff  Treatment team focus/goals: med mgmt and treatment goals  Progress note   Pt presented neatly dressed, was calm, cooperative, denied SI/HI/AVH, no psychosis observed. Pt insisted on discharging today, stating he did not want to wait until tomorrow. He reported having money, a car in the parking lot, and intended to stay in a hotel for the night. He confirmed he would call his mother so she would not worry. MSW left a voice mail with pt's discharge plans, for his mother and discussed the discharge plan with his [facility] [employer]. A follow-up appt with Dr. Aamir Gonzáles is scheduled 3/28/2022 @ 3:30 for medication management.     Financial concerns/prescription coverage: medicare   Family contact:   MotherRoyce: [phone]                                   Family requesting physician contact today:  no  Discharge plan: TBD   Access to weapons : no                                                         Outpatient provider(s): [facility]: [facility]: [phone]; SHARMILA Gonzáles (3/28/22)  Patient's preferred phone number for follow up call : [phone]   LOS:  6  Expected LOS:     Participating treatment team members: AMISHA Hay; Jt Garcia NP; Therese Madden; Mavis Weathers, GERTRUDE

## 2022-03-16 NOTE — PROGRESS NOTES
Problem: Aggression and Hostility (Behavioral Health)  Goal: *Reduce intensity and frequency of aggressive behaviors and verbalizations, treating others with respect  Outcome: Progressing Towards Goal  Variance Patient slowly responding  Goal: *Demonstrate ability to comply with simple direction  Outcome: Progressing Towards Goal  Variance Patient slowly responding  Goal: *Begin to show limited social functioning by responding appropriately to friendly encounters  Outcome: Progressing Towards Goal    Patient is present in millieu. Requires reinforcement from  staff for boundary settings, millieu expectations. Patient having difficulty processing reasons for limit setting related to boarding on general side. Patient focus on fears related to discharge. Denies si/hi. Medication compliant. During treatment team patient informed that he wanted to be discharged today. He stated that he had money in the bank, a car in the parking lot and had a place to stay. Denied si/hi. Denied a/v hallucinations. Patient was informed that social work was continuing to work on discharge follow-up and it would be ready tomorrow. Patient reiterated that he was ready to leave and wanted to leave today. Patient will be discharge. Discharge instructions gone over with patient with opportunity for questions to be asked,  Answered, and clarified. Prescriptions were signed and given to patient. Personal belongings were returned to patient. Patient escorted to [facility] where vehicle was located by [facility].

## 2022-03-16 NOTE — PROGRESS NOTES
Patient received resting in bed with eyes closed. NAD and no complaints noted. Safety measures in place. Will continue to monitor with q15min rounds. Problem: Falls - Risk of  Goal: *Absence of Falls  Description: Document Cheyenne Embs Fall Risk and appropriate interventions in the flowsheet.   Outcome: Progressing Towards Goal  Note: Fall Risk Interventions:            Medication Interventions: Teach patient to arise slowly
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
## 2022-03-19 PROBLEM — F25.9 SCHIZOAFFECTIVE DISORDER (HCC): Status: ACTIVE | Noted: 2022-03-10

## 2022-05-29 ENCOUNTER — HOSPITAL ENCOUNTER (EMERGENCY)
Age: 42
Discharge: HOME | End: 2022-05-29
Attending: EMERGENCY MEDICINE

## 2022-05-29 VITALS
HEIGHT: 66 IN | BODY MASS INDEX: 31.34 KG/M2 | HEART RATE: 106 BPM | OXYGEN SATURATION: 98 % | WEIGHT: 195 LBS | DIASTOLIC BLOOD PRESSURE: 83 MMHG | RESPIRATION RATE: 18 BRPM | TEMPERATURE: 98.2 F | SYSTOLIC BLOOD PRESSURE: 128 MMHG

## 2022-05-29 DIAGNOSIS — F19.982 SLEEP PROBLEM CAUSED BY DRUG (HCC): Primary | ICD-10-CM

## 2022-05-29 DIAGNOSIS — F17.200 SMOKER: ICD-10-CM

## 2022-05-29 LAB
ALBUMIN SERPL-MCNC: 3.9 G/DL (ref 3.5–5)
ALBUMIN/GLOB SERPL: 1.3 {RATIO} (ref 1.1–2.2)
ALP SERPL-CCNC: 68 U/L (ref 45–117)
ALT SERPL-CCNC: 60 U/L (ref 12–78)
AMPHET UR QL SCN: NEGATIVE
ANION GAP SERPL CALC-SCNC: 10 MMOL/L (ref 5–15)
AST SERPL-CCNC: 24 U/L (ref 15–37)
BARBITURATES UR QL SCN: NEGATIVE
BASOPHILS # BLD: 0.1 K/UL (ref 0–0.1)
BASOPHILS NFR BLD: 1 % (ref 0–1)
BENZODIAZ UR QL: NEGATIVE
BILIRUB SERPL-MCNC: 0.5 MG/DL (ref 0.2–1)
BUN SERPL-MCNC: 15 MG/DL (ref 6–20)
BUN/CREAT SERPL: 14 (ref 12–20)
CALCIUM SERPL-MCNC: 9 MG/DL (ref 8.5–10.1)
CANNABINOIDS UR QL SCN: NEGATIVE
CHLORIDE SERPL-SCNC: 105 MMOL/L (ref 97–108)
CO2 SERPL-SCNC: 25 MMOL/L (ref 21–32)
COCAINE UR QL SCN: NEGATIVE
CREAT SERPL-MCNC: 1.11 MG/DL (ref 0.7–1.3)
DIFFERENTIAL METHOD BLD: ABNORMAL
DRUG SCRN COMMENT,DRGCM: NORMAL
EOSINOPHIL # BLD: 0.2 K/UL (ref 0–0.4)
EOSINOPHIL NFR BLD: 2 % (ref 0–7)
ERYTHROCYTE [DISTWIDTH] IN BLOOD BY AUTOMATED COUNT: 12.8 % (ref 11.5–14.5)
ETHANOL SERPL-MCNC: <10 MG/DL
GLOBULIN SER CALC-MCNC: 3 G/DL (ref 2–4)
GLUCOSE SERPL-MCNC: 130 MG/DL (ref 65–100)
HCT VFR BLD AUTO: 45.9 % (ref 36.6–50.3)
HGB BLD-MCNC: 15.9 G/DL (ref 12.1–17)
IMM GRANULOCYTES # BLD AUTO: 0.1 K/UL (ref 0–0.04)
IMM GRANULOCYTES NFR BLD AUTO: 1 % (ref 0–0.5)
LYMPHOCYTES # BLD: 1.9 K/UL (ref 0.8–3.5)
LYMPHOCYTES NFR BLD: 26 % (ref 12–49)
MAGNESIUM SERPL-MCNC: 2.3 MG/DL (ref 1.6–2.4)
MCH RBC QN AUTO: 31.9 PG (ref 26–34)
MCHC RBC AUTO-ENTMCNC: 34.6 G/DL (ref 30–36.5)
MCV RBC AUTO: 92.2 FL (ref 80–99)
METHADONE UR QL: NEGATIVE
MONOCYTES # BLD: 0.9 K/UL (ref 0–1)
MONOCYTES NFR BLD: 13 % (ref 5–13)
NEUTS SEG # BLD: 4 K/UL (ref 1.8–8)
NEUTS SEG NFR BLD: 57 % (ref 32–75)
NRBC # BLD: 0 K/UL (ref 0–0.01)
NRBC BLD-RTO: 0 PER 100 WBC
OPIATES UR QL: NEGATIVE
PCP UR QL: NEGATIVE
PLATELET # BLD AUTO: 195 K/UL (ref 150–400)
PMV BLD AUTO: 12.2 FL (ref 8.9–12.9)
POTASSIUM SERPL-SCNC: 3.5 MMOL/L (ref 3.5–5.1)
PROT SERPL-MCNC: 6.9 G/DL (ref 6.4–8.2)
RBC # BLD AUTO: 4.98 M/UL (ref 4.1–5.7)
SODIUM SERPL-SCNC: 140 MMOL/L (ref 136–145)
TSH SERPL DL<=0.05 MIU/L-ACNC: 1.46 UIU/ML (ref 0.36–3.74)
WBC # BLD AUTO: 7.1 K/UL (ref 4.1–11.1)

## 2022-05-29 PROCEDURE — 36415 COLL VENOUS BLD VENIPUNCTURE: CPT

## 2022-05-29 PROCEDURE — 80053 COMPREHEN METABOLIC PANEL: CPT

## 2022-05-29 PROCEDURE — 82077 ASSAY SPEC XCP UR&BREATH IA: CPT

## 2022-05-29 PROCEDURE — 84443 ASSAY THYROID STIM HORMONE: CPT

## 2022-05-29 PROCEDURE — 85025 COMPLETE CBC W/AUTO DIFF WBC: CPT

## 2022-05-29 PROCEDURE — 99283 EMERGENCY DEPT VISIT LOW MDM: CPT

## 2022-05-29 PROCEDURE — 83735 ASSAY OF MAGNESIUM: CPT

## 2022-05-29 PROCEDURE — 80307 DRUG TEST PRSMV CHEM ANLYZR: CPT

## 2022-05-29 NOTE — DISCHARGE INSTRUCTIONS
It was a pleasure taking care of you at [facility] Emergency Department today. We know that when you come to [facility], you are entrusting us with your health, comfort, and safety. Our physicians and nurses honor that trust, and we truly appreciate the opportunity to care for you and your loved ones. We also value your feedback. If you receive a survey about your Emergency Department experience today, please fill it out. We care about our patients' feedback, and we listen to what you have to say. Thank you! [facility] Departments     For adult and child immunizations, family planning, TB screening, STD testing and women's health services. [facility] [place] [phone]      [facility] 25   657 [facility]   [address]   170 [facility]: Epif[facility]io [facility] 200 [facility] [phone]      [address] [place] [address]          [address] [address]     For primary care services, woman and child wellness, and some clinics providing specialty care. [facility] -- [facility]. [address] [place] [address] [phone]   411 [facility] 200 [facility] 3614 [facility] [phone]   339 [facility] [address] [place] [address] [phone]   [address] [place] [address] [place] [place] [address] [address] [place] [address]  [phone]   [address] [place] [address] [phone]   [facility] 81 [facility] [phone]   Pedrokristel [facility] 1051 [facility] [phone]   [facility]: [facility] [address] 79 [facility], [address] [phone]     [address] [phone]   [facility] 5850  Community  [phone]   [employer] [facility]  [address], [address] ([email or web address]) [phone]         Sexual Health/Woman Wellness Clinics    For STD/HIV testing and treatment, pregnancy testing and services, men's health, birth control services, LGBT services, and hepatitis/HPV vaccine services. [facility] [employer] [facility] for [place] [employer] 201 N. [facility] 75 [facility] [[email or web address]] Road [facility] 1579 [address] [phone]   [facility] [address], 5th floor [phone]   Pregnancy [address] for Women [address]  [place] [phone] 6847 N [facility] [address] [place] [address]   [phone]   [place]   [phone]   Women, Infant and Children's Services: [address] [phone]       600 [facility]   [phone]   [facility]   [phone]   [facility]     [phone]   [address] Crisis   1212 [facility] [phone]       Local Primary Care Physicians  [facility] [phone]  MD Kalie Craven MD Delilah Sitter, MD [facility] [phone]  Elfego Navarro, [facility]  MD Tessa Currie MD Adams Fritz, MD [address] [address] [phone]  MD Mihsa Acevedo MD [address] [facility] [phone]  MD Shree Ortiz MD Steffanie Solian, MD Holly Art, MD   [facility] [phone]  NG HWFTJD MD Edilson DELONG MD  [facility], NP [address] [facility] [address] [phone]  MD Osbaldo Sneed MD Broadus Mews, MD Devon Albe, MD Grayce Chain, MD Christen Dynes, MD Christene Oz, MD   [place] [address] Osmar Aguilar MD [facility] [phone]  MD Kelly Rivero, NP  Bryce Murillo, MD David Laboy, MD Jamal Duckworth, MD Renetta Foote, MD Lupillo Nagel, MD   9542 [facility] [phone]  Moncho Pacheco, MD Hayden Goodman, FNP  Yovany Mariano, LYLE Forrester, MD William Bell, MD Eduardo Junior, MD John Hathaway, MD [facility] [phone]  [facility], MD Malathi Luqeu, MD Norma Moore, MD Bo Grace, MD Collette Motta, MD   [facility] [phone]  Jihan Powers, MD Mehdi Maldonado, MD [place] [phone]  Charan Simmons, MD Amy Castillo, MD Eli Pete, MD   [facility] [phone]  Corinne Torres, MD Devang Lindo, MD Isidoro Prieto, MD Omar Washington, MD Grafton Alpers, MD José Camacho, NP  Pavan Yates MD 1619 [facility]   [phone]  Sabina Sanabria, MD Susie Hernandez, MD Navneet Younger MD   2102 [facility] [phone]  [facility], MD Fili Oakes, FNP  Mariya Wilkinson, JEFF Wilkinson, FNP  JEFF Beckford, MD Wally Johnson, NP   Isaac Kelly,  Miscellaneous:  Negrita Garcia [phone]

## 2022-05-29 NOTE — ED NOTES
I have reviewed discharge instructions with the patient. The patient verbalized understanding. Pt walked out of er with steady gait.

## 2022-05-29 NOTE — ED PROVIDER NOTES
EMERGENCY DEPARTMENT HISTORY AND PHYSICAL EXAM      Date: 5/29/2022  Patient Name: Taras Castellon    History of Presenting Illness     Chief Complaint   Patient presents with    Sleep Problem     pt ambulatory into triage with cc of \"falling into a deep sleep\". he states \"when i am siting in the recliner i fall asleep and it takes my mom 20 minutes to wake me up. it scared me so i wanted to get it checked out\" states he feels like \"something is holding me down\"       History Provided By: Patient    HPI: Taras Castellon, 38 y.o. male with significant PMHx as below, presents by POV to the ED with cc of problems waking from sleeping. This occurs most often when he sleeps in his recliner. He notes that today it took his mom over 20 minutes to wake him up. He is concerned that something may be wrong and is requesting blood work. He is on several medications that have sedating side effects and states that he is taking all of them as prescribed by his psychiatrist.  He denies all medical complaints at this time. There is been no change in his medications recently. He denies any suicidal or homicidal thoughts. There are no other complaints, changes, or physical findings at this time. Social Hx: Tobacco (1 ppd), EtOH (denies), Illicit drug use (denies)     PCP: None    No current facility-administered medications on file prior to encounter. Current Outpatient Medications on File Prior to Encounter   Medication Sig Dispense Refill    atorvastatin (LIPITOR) 80 mg tablet Take 1 Tablet by mouth daily. Indications: high amount of triglyceride in the blood 30 Tablet 0    QUEtiapine (SEROquel) 200 mg tablet Take 1 Tablet by mouth two (2) times a day. Indications: mood 60 Tablet 0    hydrOXYzine HCL (ATARAX) 50 mg tablet Take 1 Tablet by mouth three (3) times daily. Indications: anxious 90 Tablet 0    escitalopram oxalate (LEXAPRO) 20 mg tablet Take 20 mg by mouth daily.       docusate sodium (COLACE) 100 mg capsule Take 100 mg by mouth two (2) times a day.  guanFACINE IR (TENEX) 1 mg IR tablet Take 1 mg by mouth nightly.  omeprazole (PRILOSEC) 40 mg capsule Take 40 mg by mouth daily.  tamsulosin (FLOMAX) 0.4 mg capsule Take 0.4 mg by mouth daily.  clonazePAM (KlonoPIN) 1 mg tablet Take 1 mg by mouth two (2) times a day.  escitalopram oxalate (LEXAPRO) 10 mg tablet Take 10 mg by mouth daily. Indications: Anxiousness associated with Depression         Past History     Past Medical History:  Past Medical History:   Diagnosis Date    Aggressive outburst     OCD (obsessive compulsive disorder)     Psychiatric disorder     Substance abuse ([facility] [address]        Past Surgical History:  No past surgical history on file. Family History:  No family history on file. Social History:  Social History     Tobacco Use    Smoking status: Current Every Day Smoker     Packs/day: 0.50    Smokeless tobacco: Never Used   Substance Use Topics    Alcohol use: Yes     Alcohol/week: 21.0 standard drinks     Types: 21 Shots of liquor per week    Drug use: Yes     Types: Marijuana       Allergies: Allergies   Allergen Reactions    Haldol [Haloperidol Lactate] Anxiety         Review of Systems   Review of Systems   Constitutional: Negative for chills, diaphoresis and fever. HENT: Negative for congestion, ear pain, rhinorrhea and sore throat. Respiratory: Negative for cough and shortness of breath. Cardiovascular: Negative for chest pain. Gastrointestinal: Negative for abdominal pain, constipation, diarrhea, nausea and vomiting. Genitourinary: Negative for difficulty urinating, dysuria, frequency and hematuria. Musculoskeletal: Negative for arthralgias and myalgias. Neurological: Negative for headaches. Psychiatric/Behavioral: Positive for sleep disturbance. All other systems reviewed and are negative. Physical Exam   Physical Exam  Vitals and nursing note reviewed.    Constitutional:       General: He is not in acute distress. Appearance: He is well-developed. He is not diaphoretic. Comments: 38 y.o.  male    HENT:      Head: Normocephalic and atraumatic. Eyes:      General:         Right eye: No discharge. Left eye: No discharge. Conjunctiva/sclera: Conjunctivae normal.   Cardiovascular:      Rate and Rhythm: Normal rate and regular rhythm. Heart sounds: Normal heart sounds. No murmur heard. Pulmonary:      Effort: Pulmonary effort is normal. No respiratory distress. Breath sounds: Normal breath sounds. Musculoskeletal:      Cervical back: Normal range of motion and neck supple. Skin:     General: Skin is warm and dry. Neurological:      Mental Status: He is alert and oriented to person, place, and time. Psychiatric:         Attention and Perception: Attention normal.         Mood and Affect: Mood normal.         Speech: Speech normal.         Behavior: Behavior normal. Behavior is cooperative. Thought Content: Thought content normal. Thought content is not paranoid or delusional. Thought content does not include homicidal or suicidal ideation. Thought content does not include homicidal or suicidal plan. Judgment: Judgment normal.         Diagnostic Study Results     Labs -     Recent Results (from the past 12 hour(s))   CBC WITH AUTOMATED DIFF    Collection Time: 05/29/22  4:28 PM   Result Value Ref Range    WBC 7.1 4.1 - 11.1 K/uL    RBC 4.98 4.10 - 5.70 M/uL    HGB 15.9 12.1 - 17.0 g/dL    HCT 45.9 36.6 - 50.3 %    MCV 92.2 80.0 - 99.0 FL    MCH 31.9 26.0 - 34.0 PG    MCHC 34.6 30.0 - 36.5 g/dL    RDW 12.8 11.5 - 14.5 %    PLATELET 533 937 - 923 K/uL    MPV 12.2 8.9 - 12.9 FL    NRBC 0.0 0  WBC    ABSOLUTE NRBC 0.00 0.00 - 0.01 K/uL    NEUTROPHILS 57 32 - 75 %    LYMPHOCYTES 26 12 - 49 %    MONOCYTES 13 5 - 13 %    EOSINOPHILS 2 0 - 7 %    BASOPHILS 1 0 - 1 %    IMMATURE GRANULOCYTES 1 (H) 0.0 - 0.5 %    ABS.  NEUTROPHILS 4.0 1.8 - 8.0 K/UL    ABS. LYMPHOCYTES 1.9 0.8 - 3.5 K/UL    ABS. MONOCYTES 0.9 0.0 - 1.0 K/UL    ABS. EOSINOPHILS 0.2 0.0 - 0.4 K/UL    ABS. BASOPHILS 0.1 0.0 - 0.1 K/UL    ABS. IMM. GRANS. 0.1 (H) 0.00 - 0.04 K/UL    DF AUTOMATED     METABOLIC PANEL, COMPREHENSIVE    Collection Time: 05/29/22  4:28 PM   Result Value Ref Range    Sodium 140 136 - 145 mmol/L    Potassium 3.5 3.5 - 5.1 mmol/L    Chloride 105 97 - 108 mmol/L    CO2 25 21 - 32 mmol/L    Anion gap 10 5 - 15 mmol/L    Glucose 130 (H) 65 - 100 mg/dL    BUN 15 6 - 20 MG/DL    Creatinine 1.11 0.70 - 1.30 MG/DL    BUN/Creatinine ratio 14 12 - 20      GFR est AA >60 >60 ml/min/1.73m2    GFR est non-AA >60 >60 ml/min/1.73m2    Calcium 9.0 8.5 - 10.1 MG/DL    Bilirubin, total 0.5 0.2 - 1.0 MG/DL    ALT (SGPT) 60 12 - 78 U/L    AST (SGOT) 24 15 - 37 U/L    Alk. phosphatase 68 45 - 117 U/L    Protein, total 6.9 6.4 - 8.2 g/dL    Albumin 3.9 3.5 - 5.0 g/dL    Globulin 3.0 2.0 - 4.0 g/dL    A-G Ratio 1.3 1.1 - 2.2     MAGNESIUM    Collection Time: 05/29/22  4:28 PM   Result Value Ref Range    Magnesium 2.3 1.6 - 2.4 mg/dL   TSH 3RD GENERATION    Collection Time: 05/29/22  4:28 PM   Result Value Ref Range    TSH 1.46 0.36 - 3.74 uIU/mL   DRUG SCREEN, URINE    Collection Time: 05/29/22  4:40 PM   Result Value Ref Range    AMPHETAMINES Negative NEG      BARBITURATES Negative NEG      BENZODIAZEPINES Negative NEG      COCAINE Negative NEG      METHADONE Negative NEG      OPIATES Negative NEG      PCP(PHENCYCLIDINE) Negative NEG      THC (TH-CANNABINOL) Negative NEG      Drug screen comment (NOTE)    ETHYL ALCOHOL    Collection Time: 05/29/22  4:40 PM   Result Value Ref Range    ALCOHOL(ETHYL),SERUM <10 <10 MG/DL       Radiologic Studies - none    Medical Decision Making   I am the first provider for this patient. I reviewed the vital signs, available nursing notes, past medical history, past surgical history, family history and social history.     Vital Signs-Reviewed the patient's vital signs. Patient Vitals for the past 12 hrs:   Temp Pulse Resp BP SpO2   05/29/22 1648  (!) 106      05/29/22 1532 98.2 °F (36.8 °C) (!) 114 18 128/83 98 %       Records Reviewed: Nursing Notes and Old Medical Records    Provider Notes (Medical Decision Making):   Patient presents ED slightly tachycardic with concerns of sleeping too heavy. There are no other concerns at this time. He is requested blood work. CBC, metabolic, TSH, alcohol, and drug screen all without acute issues. I have recommended the patient follow-up with primary care medicine for further evaluation and a physical.  He should also follow-up with his psychiatrist who likely needs to change him his medications to decrease the side effects. He can return to the ED for deterioration. ED Course:   Initial assessment performed. The patients presenting problems have been discussed, and they are in agreement with the care plan formulated and outlined with them. I have encouraged them to ask questions as they arise throughout their visit. Tobacco Counseling  Discussed the risks of smoking and the benefits of smoking cessation as well as the long term sequelae of smoking with the patient. The patient verbalized their understanding. Counseled patient for approximately 3 minutes. Critical Care Time: None    Disposition:  DISCHARGE NOTE:  5:35 PM  The pt is ready for discharge. The pt's signs, symptoms, diagnosis, and discharge instructions have been discussed and pt has conveyed their understanding. The pt is to follow up as recommended or return to ER should their symptoms worsen. Plan has been discussed and pt is in agreement. PLAN:  1. Current Discharge Medication List        2.    Follow-up Information     Follow up With Specialties Details Why Contact Info    Your PCP  In 1 week As needed     [facility] EMERGENCY DEPT Emergency Medicine  If symptoms worsen [address]  [address]   [address] [address]  [phone]        Return to ED if worse     Diagnosis     Clinical Impression:   1. Sleep problem caused by drug ([address]    2. Smoker          Please note that this dictation was completed with [employer], the computer voice recognition software. Quite often unanticipated grammatical, syntax, homophones, and other interpretive errors are inadvertently transcribed by the computer software. Please disregards these errors. Please excuse any errors that have escaped final proofreading.

## 2022-06-14 ENCOUNTER — HOSPITAL ENCOUNTER (EMERGENCY)
Age: 42
Discharge: HOME | End: 2022-06-15
Attending: EMERGENCY MEDICINE | Admitting: EMERGENCY MEDICINE

## 2022-06-14 DIAGNOSIS — R45.89 AT RISK FOR SELF HARM: ICD-10-CM

## 2022-06-14 DIAGNOSIS — F41.1 GENERALIZED ANXIETY DISORDER: Primary | ICD-10-CM

## 2022-06-14 DIAGNOSIS — R45.5 AGGRESSIVE OUTBURST: ICD-10-CM

## 2022-06-14 LAB
ALBUMIN SERPL-MCNC: 4.1 G/DL (ref 3.5–5)
ALBUMIN/GLOB SERPL: 1.2 {RATIO} (ref 1.1–2.2)
ALP SERPL-CCNC: 83 U/L (ref 45–117)
ALT SERPL-CCNC: 62 U/L (ref 12–78)
ANION GAP SERPL CALC-SCNC: 8 MMOL/L (ref 5–15)
APAP SERPL-MCNC: <2 UG/ML (ref 10–30)
APPEARANCE UR: CLEAR
AST SERPL-CCNC: 27 U/L (ref 15–37)
BACTERIA URNS QL MICRO: NEGATIVE /HPF
BASOPHILS # BLD: 0.1 K/UL (ref 0–0.1)
BASOPHILS NFR BLD: 1 % (ref 0–1)
BILIRUB SERPL-MCNC: 0.6 MG/DL (ref 0.2–1)
BILIRUB UR QL: NEGATIVE
BUN SERPL-MCNC: 18 MG/DL (ref 6–20)
BUN/CREAT SERPL: 16 (ref 12–20)
CALCIUM SERPL-MCNC: 8.8 MG/DL (ref 8.5–10.1)
CHLORIDE SERPL-SCNC: 107 MMOL/L (ref 97–108)
CO2 SERPL-SCNC: 23 MMOL/L (ref 21–32)
COLOR UR: NORMAL
CREAT SERPL-MCNC: 1.14 MG/DL (ref 0.7–1.3)
DIFFERENTIAL METHOD BLD: ABNORMAL
EOSINOPHIL # BLD: 0.2 K/UL (ref 0–0.4)
EOSINOPHIL NFR BLD: 3 % (ref 0–7)
EPITH CASTS URNS QL MICRO: NORMAL /LPF
ERYTHROCYTE [DISTWIDTH] IN BLOOD BY AUTOMATED COUNT: 12.6 % (ref 11.5–14.5)
ETHANOL SERPL-MCNC: <10 MG/DL
GLOBULIN SER CALC-MCNC: 3.5 G/DL (ref 2–4)
GLUCOSE SERPL-MCNC: 100 MG/DL (ref 65–100)
GLUCOSE UR STRIP.AUTO-MCNC: NEGATIVE MG/DL
HCT VFR BLD AUTO: 48.5 % (ref 36.6–50.3)
HGB BLD-MCNC: 17.3 G/DL (ref 12.1–17)
HGB UR QL STRIP: NEGATIVE
HYALINE CASTS URNS QL MICRO: NORMAL /LPF (ref 0–5)
IMM GRANULOCYTES # BLD AUTO: 0.1 K/UL (ref 0–0.04)
IMM GRANULOCYTES NFR BLD AUTO: 1 % (ref 0–0.5)
KETONES UR QL STRIP.AUTO: NEGATIVE MG/DL
LEUKOCYTE ESTERASE UR QL STRIP.AUTO: NEGATIVE
LYMPHOCYTES # BLD: 2.4 K/UL (ref 0.8–3.5)
LYMPHOCYTES NFR BLD: 29 % (ref 12–49)
MCH RBC QN AUTO: 32.3 PG (ref 26–34)
MCHC RBC AUTO-ENTMCNC: 35.7 G/DL (ref 30–36.5)
MCV RBC AUTO: 90.7 FL (ref 80–99)
MONOCYTES # BLD: 1.2 K/UL (ref 0–1)
MONOCYTES NFR BLD: 15 % (ref 5–13)
NEUTS SEG # BLD: 4.3 K/UL (ref 1.8–8)
NEUTS SEG NFR BLD: 51 % (ref 32–75)
NITRITE UR QL STRIP.AUTO: NEGATIVE
NRBC # BLD: 0 K/UL (ref 0–0.01)
NRBC BLD-RTO: 0 PER 100 WBC
PH UR STRIP: 5.5 [PH] (ref 5–8)
PLATELET # BLD AUTO: 224 K/UL (ref 150–400)
PMV BLD AUTO: 12.1 FL (ref 8.9–12.9)
POTASSIUM SERPL-SCNC: 3.4 MMOL/L (ref 3.5–5.1)
PROT SERPL-MCNC: 7.6 G/DL (ref 6.4–8.2)
PROT UR STRIP-MCNC: NEGATIVE MG/DL
RBC # BLD AUTO: 5.35 M/UL (ref 4.1–5.7)
RBC #/AREA URNS HPF: NORMAL /HPF (ref 0–5)
SALICYLATES SERPL-MCNC: 3.3 MG/DL (ref 2.8–20)
SODIUM SERPL-SCNC: 138 MMOL/L (ref 136–145)
SP GR UR REFRACTOMETRY: 1.02 (ref 1–1.03)
UR CULT HOLD, URHOLD: NORMAL
UROBILINOGEN UR QL STRIP.AUTO: 1 EU/DL (ref 0.2–1)
WBC # BLD AUTO: 8.4 K/UL (ref 4.1–11.1)
WBC URNS QL MICRO: NORMAL /HPF (ref 0–4)

## 2022-06-14 PROCEDURE — 80053 COMPREHEN METABOLIC PANEL: CPT

## 2022-06-14 PROCEDURE — 99285 EMERGENCY DEPT VISIT HI MDM: CPT

## 2022-06-14 PROCEDURE — 85025 COMPLETE CBC W/AUTO DIFF WBC: CPT

## 2022-06-14 PROCEDURE — 80307 DRUG TEST PRSMV CHEM ANLYZR: CPT

## 2022-06-14 PROCEDURE — 80179 DRUG ASSAY SALICYLATE: CPT

## 2022-06-14 PROCEDURE — 74011250637 HC RX REV CODE- 250/637: Performed by: STUDENT IN AN ORGANIZED HEALTH CARE EDUCATION/TRAINING PROGRAM

## 2022-06-14 PROCEDURE — 81001 URINALYSIS AUTO W/SCOPE: CPT

## 2022-06-14 PROCEDURE — 82077 ASSAY SPEC XCP UR&BREATH IA: CPT

## 2022-06-14 PROCEDURE — 90791 PSYCH DIAGNOSTIC EVALUATION: CPT

## 2022-06-14 PROCEDURE — 80143 DRUG ASSAY ACETAMINOPHEN: CPT

## 2022-06-14 RX ORDER — ALPRAZOLAM 0.5 MG/1
0.5 TABLET ORAL
Status: COMPLETED | OUTPATIENT
Start: 2022-06-14 | End: 2022-06-14

## 2022-06-14 RX ADMIN — ALPRAZOLAM 0.5 MG: 0.5 TABLET ORAL at 19:18

## 2022-06-14 NOTE — ED TRIAGE NOTES
TRIAGE NOTE:  Patient arrives with c/o I'm unhappy with my doctor, he wants to get me a year to take me off the klonopin and i'm scared of relapsing. Patient states he was on Klonopin 1 in the morning and one at night and he would like to get that back. Patient reports he has been out of [facility] 4 years ago, and I was just discharged from this hospital.   Patient denies SI/HI.

## 2022-06-14 NOTE — ED PROVIDER NOTES
38 y.o. male with history of OCD, psychiatric disorders and substance abuse presents to ED with increased anxiety. Patient reports that since being discharged from [facility] in 2018 he has been stable on his medications. He currently sees [address]  and his doctor is talking about lowering his Klonipin which makes him \"very nervous\". He started lowering the dose about a month ago and this morning patient woke up this morning with high anxiety. It has been increasing since started lowering the dose. He notes that he told his doctor about this but the doctor will not refill his Klonipin the way it was. He feels that when it was 1 mg in the morning and 1 mg at night he \"felt normal\". He hopes that today in the ED he can get a Rx to get back to his normal dose of Klonipin. Denies suicidal or homicidal ideations, CP, SOB, cough, fevers, chills. The history is provided by the patient. Mental Health Problem  Pertinent negatives include no chest pain and no headaches. Past Medical History:   Diagnosis Date    Aggressive outburst     OCD (obsessive compulsive disorder)     Psychiatric disorder     Substance abuse ([facility] [address]        No past surgical history on file. No family history on file. Social History     Socioeconomic History    Marital status: SINGLE     Spouse name: Not on file    Number of children: Not on file    Years of education: Not on file    Highest education level: Not on file   Occupational History    Not on file   Tobacco Use    Smoking status: Current Every Day Smoker     Packs/day: 0.50    Smokeless tobacco: Never Used   Substance and Sexual Activity    Alcohol use:  Yes     Alcohol/week: 21.0 standard drinks     Types: 21 Shots of liquor per week    Drug use: Yes     Types: Marijuana    Sexual activity: Never   Other Topics Concern     Service Not Asked    Blood Transfusions Not Asked    Caffeine Concern Not Asked    Occupational Exposure Not Asked    Hobby Hazards Not Asked    Sleep Concern Not Asked    Stress Concern Not Asked    Weight Concern Not Asked    Special Diet Not Asked    Back Care Not Asked    Exercise Not Asked    Bike Helmet Not Asked   [address] Not Asked    Self-Exams Not Asked   Social History Narrative    Not on file     Social Determinants of Health     Financial Resource Strain:     Difficulty of Paying Living Expenses: Not on file   Food Insecurity:     Worried About Running Out of Food in the Last Year: Not on file    [place] of Food in the Last Year: Not on file   Transportation Needs:     Lack of Transportation (Medical): Not on file    Lack of Transportation (Non-Medical): Not on file   Physical Activity:     Days of Exercise per Week: Not on file    Minutes of Exercise per Session: Not on file   Stress:     Feeling of Stress : Not on file   Social Connections:     Frequency of Communication with Friends and Family: Not on file    Frequency of Social Gatherings with Friends and Family: Not on file    Attends Caodaism Services: Not on file    Active Member of [address] [place] [address] or Organizations: Not on file    Attends Club or Organization Meetings: Not on file    Marital Status: Not on file   Intimate Partner Violence:     Fear of Current or Ex-Partner: Not on file    Emotionally Abused: Not on file    Physically Abused: Not on file    Sexually Abused: Not on file   Housing Stability:     Unable to Pay for Housing in the Last Year: Not on file    Number of [place] in the Last Year: Not on file    Unstable Housing in the Last Year: Not on file         ALLERGIES: Haldol [haloperidol lactate]    Review of Systems   Constitutional: Negative for fever. HENT: Negative for congestion and sinus pain. Respiratory: Negative for cough. Cardiovascular: Negative for chest pain. Gastrointestinal: Negative for nausea and vomiting. Genitourinary: Negative for dysuria. Musculoskeletal: Negative for myalgias. Neurological: Negative for headaches. Hematological: Negative for adenopathy. Psychiatric/Behavioral: The patient is nervous/anxious. All other systems reviewed and are negative. Vitals:    06/14/22 1745   BP: 124/79   Pulse: (!) 103   Resp: 18   Temp: 97.4 °F (36.3 °C)   SpO2: 99%            Physical Exam  Vitals and nursing note reviewed. Constitutional:       Appearance: Normal appearance. Eyes:      Extraocular Movements: Extraocular movements intact. Pupils: Pupils are equal, round, and reactive to light. Cardiovascular:      Rate and Rhythm: Normal rate and regular rhythm. Heart sounds: Normal heart sounds. Pulmonary:      Effort: Pulmonary effort is normal.      Breath sounds: Normal breath sounds. Abdominal:      Palpations: Abdomen is soft. Tenderness: There is no abdominal tenderness. Lymphadenopathy:      Cervical: No cervical adenopathy. Skin:     General: Skin is warm and dry. Neurological:      General: No focal deficit present. Mental Status: He is alert and oriented to person, place, and time. Psychiatric:         Mood and Affect: Mood normal.         Behavior: Behavior normal.          MDM  Number of Diagnoses or Management Options  Diagnosis management comments: 38 y.o. male with history of OCD, psychiatric disorders and substance abuse presents to ED with increased anxiety. Vital signs stable in triage. Physical exam unremarkable with mood and affect as well as behavior normal.  BSMART consulted as outlined below. [facility] admitting as outlined below. Patient will be admitted and is currently on behavioral health hold.        Amount and/or Complexity of Data Reviewed  Clinical lab tests: ordered  Tests in the radiology section of CPT®: ordered  Discuss the patient with other providers: yes      ED Course as of 06/14/22 2056 Tue Jun 14, 2022 2014 Talked to [facility] who stated that patient might be candidate for admission, she is going to discuss with [facility]  [AM]   [facility] flagging patient moderate risk and will be admitting, patient going to be on behavioral health hold. [AM]      ED Course User Index  [AM] SARAH Almanzar       Procedures

## 2022-06-15 VITALS
OXYGEN SATURATION: 95 % | RESPIRATION RATE: 19 BRPM | SYSTOLIC BLOOD PRESSURE: 135 MMHG | TEMPERATURE: 99.3 F | HEART RATE: 89 BPM | DIASTOLIC BLOOD PRESSURE: 93 MMHG

## 2022-06-15 LAB
AMPHET UR QL SCN: NEGATIVE
BARBITURATES UR QL SCN: NEGATIVE
BENZODIAZ UR QL: POSITIVE
CANNABINOIDS UR QL SCN: NEGATIVE
COCAINE UR QL SCN: NEGATIVE
COMMENT, HOLDF: NORMAL
DRUG SCRN COMMENT,DRGCM: ABNORMAL
METHADONE UR QL: NEGATIVE
OPIATES UR QL: NEGATIVE
PCP UR QL: NEGATIVE
SAMPLES BEING HELD,HOLD: NORMAL

## 2022-06-15 PROCEDURE — 74011250637 HC RX REV CODE- 250/637: Performed by: NURSE PRACTITIONER

## 2022-06-15 PROCEDURE — 74011250637 HC RX REV CODE- 250/637: Performed by: EMERGENCY MEDICINE

## 2022-06-15 PROCEDURE — 74011250637 HC RX REV CODE- 250/637: Performed by: STUDENT IN AN ORGANIZED HEALTH CARE EDUCATION/TRAINING PROGRAM

## 2022-06-15 RX ORDER — ALPRAZOLAM 0.5 MG/1
0.5 TABLET ORAL
Status: DISCONTINUED | OUTPATIENT
Start: 2022-06-15 | End: 2022-06-15 | Stop reason: HOSPADM

## 2022-06-15 RX ORDER — ESCITALOPRAM OXALATE 10 MG/1
10 TABLET ORAL DAILY
Status: DISCONTINUED | OUTPATIENT
Start: 2022-06-15 | End: 2022-06-15 | Stop reason: HOSPADM

## 2022-06-15 RX ORDER — QUETIAPINE FUMARATE 100 MG/1
200 TABLET, FILM COATED ORAL
Status: DISCONTINUED | OUTPATIENT
Start: 2022-06-15 | End: 2022-06-15 | Stop reason: HOSPADM

## 2022-06-15 RX ORDER — QUETIAPINE FUMARATE 100 MG/1
200 TABLET, FILM COATED ORAL
Status: COMPLETED | OUTPATIENT
Start: 2022-06-15 | End: 2022-06-15

## 2022-06-15 RX ORDER — CLONAZEPAM 1 MG/1
0.5 TABLET ORAL 3 TIMES DAILY
Status: DISCONTINUED | OUTPATIENT
Start: 2022-06-15 | End: 2022-06-15 | Stop reason: HOSPADM

## 2022-06-15 RX ADMIN — QUETIAPINE FUMARATE 200 MG: 100 TABLET ORAL at 00:58

## 2022-06-15 RX ADMIN — ESCITALOPRAM 10 MG: 10 TABLET, FILM COATED ORAL at 12:47

## 2022-06-15 RX ADMIN — ALPRAZOLAM 0.5 MG: 0.5 TABLET ORAL at 12:54

## 2022-06-15 NOTE — BSMART NOTE
Writer spoke with Pt who reported to not be willing to be placed at [facility] and instead requesting to await a bed here at [facility]. Writer made Pt aware that there were no beds available during this time here at [facility].

## 2022-06-15 NOTE — ED NOTES
Patient changed into green gown by SHAKIRA Browne. Cords removed from the room. Charge RN aware of sitter need.

## 2022-06-15 NOTE — ED NOTES
Bedside and Verbal shift change report given to Birdie Florentino RN (oncoming nurse) by Zena Molina RN (offgoing nurse). Report included the following information SBAR, Kardex, ED Summary, Recent Results and Med Rec Status.

## 2022-06-15 NOTE — BH NOTES
Malu Sanchez  was seen for a behavioral health evaluation. My dictated Psychiatric evaluation note to follow. Will restart patient on seroquel, lexapro and klonopin at his request. Patient is open to a voluntary admission, please continue with bed search.

## 2022-06-15 NOTE — BSMART NOTE
BSMART counselor observed patient alert and oriented in a calm mood. Sitter at bedside. Counselor inquired about patient's preferences of placement as he had indicated earlier to only be placed at [facility]. Patient stated still that he prefers only [facility] on the general unit. Patient was informed that per access earlier that no male beds were available however counselor would double check the status again. Patient and his sitter informed that nurse had recently told patient that he would likely be going up to the unit later on tonight. BSMART counselor will follow up with Access for an update.     Merna Cortez [facility]

## 2022-06-15 NOTE — BSMART NOTE
Writer received call back from PACT Team Clinician Lionel Mcknight and provided [address] contact to move forward with Webex assessment for TDO determination

## 2022-06-15 NOTE — CONSULTS
PSYCHIATRY CONSULT NOTE    REASON FOR CONSULT: Psych hold in ED      HISTORY OF PRESENTING COMPLAINT:  Liliya Segovia is a 38 y.o. WHITE/NON- male who is currently seen in the ED at [facility]. Patient presented to the ED due to SI, no plan. Patient was calm and cooperative but tearful and anxious. He reported that his psychiatrist got him upset because he has has been trying to wean him off his klonopin. He reported that he has been taking klonopin for 4 years and he wants to understand why his psychiatrist is trying to wean him off of it. Patient reported he has been taking seroquel and lexapro and they have been very helpful to him. Patient was restless and rocking himself back and forth. He was educated on the effects of taking klonopin long term such as addiction and cognitive impairment. Patient verbalized understanding and became calm. He was reassured that the weaning will not be abruptly but will be a gradual process. Patient was educated on how to cope with his anxiety. Patient denied SI/HI/AVH at present time. He denies any concerns with his sleep and appetite. PAST PSYCHIATRIC HISTORY: Patient reported he is currently following Dr Feliberto Whiteside out patient. SUBSTANCE ABUSE HISTORY: Per chart, patient reported excessive use of alcohol and marijuana within the last 2 months. PAST MEDICAL HISTORY:    Please see H&P for details. Past Medical History:   Diagnosis Date    Aggressive outburst     OCD (obsessive compulsive disorder)     Psychiatric disorder     Substance abuse ([facility] [address]      Prior to Admission medications    Medication Sig Start Date End Date Taking? Authorizing Provider   atorvastatin (LIPITOR) 80 mg tablet Take 1 Tablet by mouth daily. Indications: high amount of triglyceride in the blood 3/16/22   Celi Graves NP   QUEtiapine (SEROquel) 200 mg tablet Take 1 Tablet by mouth two (2) times a day.  Indications: mood 3/16/22   Malaika Bacon NP hydrOXYzine HCL (ATARAX) 50 mg tablet Take 1 Tablet by mouth three (3) times daily. Indications: anxious 3/16/22   Wilver JAY NP   escitalopram oxalate (LEXAPRO) 20 mg tablet Take 20 mg by mouth daily. Provider, Historical   docusate sodium (COLACE) 100 mg capsule Take 100 mg by mouth two (2) times a day. Provider, Historical   guanFACINE IR (TENEX) 1 mg IR tablet Take 1 mg by mouth nightly. Provider, Historical   omeprazole (PRILOSEC) 40 mg capsule Take 40 mg by mouth daily. Provider, Historical   tamsulosin (FLOMAX) 0.4 mg capsule Take 0.4 mg by mouth daily. Provider, Historical   clonazePAM (KlonoPIN) 1 mg tablet Take 1 mg by mouth two (2) times a day. Provider, Historical   escitalopram oxalate (LEXAPRO) 10 mg tablet Take 10 mg by mouth daily. Indications: Anxiousness associated with Depression    Other, MD Maryam     Vitals:    06/14/22 1745 06/15/22 0828   BP: 124/79 (!) 135/93   Pulse: (!) 103 89   Resp: 18 19   Temp: 97.4 °F (36.3 °C) 99.3 °F (37.4 °C)   SpO2: 99% 95%     Lab Results   Component Value Date/Time    WBC 8.4 06/14/2022 08:30 PM    HGB 17.3 (H) 06/14/2022 08:30 PM    HCT 48.5 06/14/2022 08:30 PM    PLATELET 548 61/94/0732 08:30 PM    MCV 90.7 06/14/2022 08:30 PM     Lab Results   Component Value Date/Time    Sodium 138 06/14/2022 08:30 PM    Potassium 3.4 (L) 06/14/2022 08:30 PM    Chloride 107 06/14/2022 08:30 PM    CO2 23 06/14/2022 08:30 PM    Anion gap 8 06/14/2022 08:30 PM    Glucose 100 06/14/2022 08:30 PM    Glucose 93 03/11/2022 05:04 AM    BUN 18 06/14/2022 08:30 PM    Creatinine 1.14 06/14/2022 08:30 PM    BUN/Creatinine ratio 16 06/14/2022 08:30 PM    GFR est AA >60 06/14/2022 08:30 PM    GFR est non-AA >60 06/14/2022 08:30 PM    Calcium 8.8 06/14/2022 08:30 PM    Bilirubin, total 0.6 06/14/2022 08:30 PM    Alk.  phosphatase 83 06/14/2022 08:30 PM    Protein, total 7.6 06/14/2022 08:30 PM    Albumin 4.1 06/14/2022 08:30 PM    Globulin 3.5 06/14/2022 08:30 PM    A-G Ratio 1.2 06/14/2022 08:30 PM    ALT (SGPT) 62 06/14/2022 08:30 PM    AST (SGOT) 27 06/14/2022 08:30 PM     No results found for: VALF2, VALAC, VALP, VALPR, DS6, CRBAM, CRBAMP, CARB2, XCRBAM  No results found for: LITHM  RADIOLOGY REPORTS:(reviewed/updated 6/15/2022)  No results found. No results found for: John Parra [id number], [id number], [id number], PREGU, POCHCG, MHCGN, HCGQR, THCGA1, SHCG, HCGN, HCGSERUM, HCGURQLPOC    PSYCHOSOCIAL HISTORY: Per chart, patient lives with mother        MENTAL STATUS EXAM:    General appearance:dressed in hospital clothing   psychomotor activity is wnl  Eye contact: good eye contact  Speech: Spontaneous, soft, decreased output. Affect : Depressed, anxious, tearful  Mood: \"anxious \"  Thought Process: Logical, goal directed  Perception: Denies AH or VH. Thought Content: denies HI/SI or Plan  Insight: Partial  Judgement: Fair  Cognition: Intact grossly. ASSESSMENT AND PLAN:  Shon Castro meets criteria for a diagnosis of unspecified mood disorder, hx of OCD  . Will restart patient on seroquel, lexapro and klonopin at his request.   Patient is open to a voluntary admission, please continue with bed search. Thank your your consult. Please feel free to consult us again as needed.

## 2022-06-15 NOTE — BSMART NOTE
TDO assessment canceled after writer received update from [address] of PT being willing to stay voluntarily.  Writer attempted to contact [employer] (Bárbara/ [phone]) three times and left voice mail request call back
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
## 2022-06-15 NOTE — BSMART NOTE
BSMART assessment completed, and suicide risk level noted to be Moderate. Primary Nurse Octavio Montgomery and Charge Nurse Jesse Avila and Physician Melina Prasad notified. Concerns not observed. Security/Off- has not been notified.

## 2022-06-15 NOTE — BSMART NOTE
AISLINN counselor spoke with Osmar Aguirre in Access and confirmed that there are no male beds available at [facility] and no expected discharges for male beds at [facility] for today. She indicated that if patient would like to consider another [employer] location, access could be made aware. Writer spoke with patient who stated that if no beds are available, he would like to be discharged as he has been here almost 24 hours and just wants a shower at this point. Patient stated that he is not a threat to himself and has an appointment with his psychiatrist on tomorrow where he can discuss medication adjustments as he indicated that was his reasoning for seeking inpatient admission. Nurse Janeth was made aware of the above information. She was also informed that patient's  through [address] [place] [address] will be contacted to discuss patient's care and explore concerns if any. AISLINN counselor spoke with Chriss Cisneros [phone] (PACT CM at [facility]). He confirmed that patient does have an appointment with psychiatrist Dr. Ludivina Cardona at 2:30pm on 6/16/22. PACT CM denied having any concerns with patient wanting to discharge from the ER at this time.     Writer to follow up with the ER doc regarding patient's request.    Fam Gutierrez [facility]

## 2022-06-15 NOTE — ED NOTES
Patient requesting evening dose of Klonopin and Seroquel, will notify provider. Pt remains calm, sitting in stretcher with sitter at bedside.

## 2022-06-15 NOTE — ED NOTES
Patient moved to ED 19. All cords removed from room. Patient is in green gown and belongings secured by previous RN. Patient denies pain or other needs at this time. Sitter is at bedside.

## 2022-06-15 NOTE — BSMART NOTE
Writer was informed by [address] that Pt is requesting to leave.  Writer contacted [facility] PACT  Cammie Pak [phone], due to [facility], THE side PACT on-call Clinican ) being unavailable to request TDO evaluation

## 2022-06-15 NOTE — BSMART NOTE
BSMART Liaison Team Note     LOS:  17 hours    Patient goal(s) for today: take medications as prescribed, communicate needs to staff in appropriate manor, practice coping skills  BSMART Liaison team focus/goals: assess needs, titrate medications, encourage use of coping skills, provide education and support    Progress note: Pt brought self to ED c/o SI without plan. He is a psychiatric hold int he ED at time of this assessment. Pt was received resting in bed with 1:1 sitter at bedside. He was alert, oriented and tearful. He reported he came to ED because his doctor \"got me upset. \" He discussed that his OP Psychiatrist, Dr Ludivina Gambino, informed Pt of plan to titrate klonopin over the 12 months to slowly and safely ween patient off addictive benzodiazepine. Pt was tearful with discussing plan to DC klonopin. He was restless, repeatedly throwing himself down in the bed and then sitting back up. He reported high levels of anxiety and feels that without klonopin he will become so impulsive he will \"commit a crime and end up back in [place]. \" MSW and NP [provided education on addictive nature of klonopin as well as purpose of decreasing dose slowly to keep Pt safe with withdrawal and allow him time to learn and practice his coping skills. He repeated \"I'm getting more agitated\" and rocked back and forth in bed. MSW engaged Pt in discussion around coping skills. Pt reported xanax, cigarettes and driving a tractor as his primary coping strategies. MSW offered brief educations around grounding exercises to help cope with anxiety. Pt remained focused on klonopin throughout assessment. He denied SI, HI, and AVH. He reported no issues with sleep or appetite. MSW and NP educated that [address] [place] [address] would likely continue with Dr Dimitrios De Santiago for dosing klonopin with goal to decrease. Pt verbalized understanding. He remains agreeable to voluntary admission to [facility] for medication management.      Barriers to Discharge: SI  Guns in the home: no     Outpatient provider(s):  Lieutenant Bon Ernst/psychiatrist ,Iris Pendleton, and PACT Team Members   Insurance info/prescription coverage:  [place] MEDICARE/VA MEDICARE PART A    Diagnosis: DDx;Obsessive Compulsive Disorder Per report     Plan:  Liaison Team to continue providing ongoing [address] assessment and support while BSMART conducting U bedsmart  Follow up Psych Consult placed? yes. Psychiatrist updated?  yes - Charito Peacock NP present during assessment      Participating treatment team members: Iglesia Tam MSW; Charito Peacock NP

## 2022-08-22 ENCOUNTER — HOSPITAL ENCOUNTER (EMERGENCY)
Age: 42
Discharge: HOME | End: 2022-08-22
Attending: EMERGENCY MEDICINE

## 2022-08-22 ENCOUNTER — APPOINTMENT (OUTPATIENT)
Dept: GENERAL RADIOLOGY | Age: 42
End: 2022-08-22
Attending: EMERGENCY MEDICINE

## 2022-08-22 VITALS
OXYGEN SATURATION: 96 % | TEMPERATURE: 97 F | DIASTOLIC BLOOD PRESSURE: 78 MMHG | SYSTOLIC BLOOD PRESSURE: 124 MMHG | RESPIRATION RATE: 20 BRPM | HEART RATE: 100 BPM

## 2022-08-22 DIAGNOSIS — Z20.822 COUGH WITH EXPOSURE TO COVID-19 VIRUS: Primary | ICD-10-CM

## 2022-08-22 DIAGNOSIS — R05.8 COUGH WITH EXPOSURE TO COVID-19 VIRUS: Primary | ICD-10-CM

## 2022-08-22 LAB
SARS-COV-2, COV2: NORMAL
SARS-COV-2, XPLCVT: DETECTED
SOURCE, COVRS: ABNORMAL

## 2022-08-22 PROCEDURE — U0005 INFEC AGEN DETEC AMPLI PROBE: HCPCS

## 2022-08-22 PROCEDURE — 71046 X-RAY EXAM CHEST 2 VIEWS: CPT

## 2022-08-22 PROCEDURE — 99284 EMERGENCY DEPT VISIT MOD MDM: CPT

## 2022-08-22 RX ORDER — CETIRIZINE HCL 10 MG
10 TABLET ORAL ONCE
Status: DISCONTINUED | OUTPATIENT
Start: 2022-08-22 | End: 2022-08-22 | Stop reason: HOSPADM

## 2022-08-22 RX ORDER — AZITHROMYCIN 250 MG/1
TABLET, FILM COATED ORAL
Qty: 6 TABLET | Refills: 0 | Status: SHIPPED | OUTPATIENT
Start: 2022-08-22 | End: 2022-08-27

## 2022-08-22 RX ORDER — BENZONATATE 100 MG/1
100 CAPSULE ORAL
Qty: 30 CAPSULE | Refills: 0 | Status: SHIPPED | OUTPATIENT
Start: 2022-08-22 | End: 2022-08-29

## 2022-08-22 RX ORDER — DEXAMETHASONE SODIUM PHOSPHATE 10 MG/ML
10 INJECTION INTRAMUSCULAR; INTRAVENOUS ONCE
Status: DISCONTINUED | OUTPATIENT
Start: 2022-08-22 | End: 2022-08-22 | Stop reason: HOSPADM

## 2022-08-22 RX ORDER — FAMOTIDINE 20 MG/1
20 TABLET, FILM COATED ORAL
Status: DISCONTINUED | OUTPATIENT
Start: 2022-08-22 | End: 2022-08-22 | Stop reason: HOSPADM

## 2022-08-22 RX ORDER — ALBUTEROL SULFATE 90 UG/1
2 AEROSOL, METERED RESPIRATORY (INHALATION)
Qty: 18 G | Refills: 0 | Status: SHIPPED | OUTPATIENT
Start: 2022-08-22

## 2022-08-22 RX ORDER — PREDNISONE 20 MG/1
40 TABLET ORAL DAILY
Qty: 10 TABLET | Refills: 0 | Status: SHIPPED | OUTPATIENT
Start: 2022-08-22 | End: 2022-08-27

## 2022-08-22 NOTE — ED TRIAGE NOTES
Pt c/o cough x 2 days, denies fever, stated he coughs so hard that he passes out, pt doesn't know if it is bronchitis or allergies, here to figure that out

## 2022-08-22 NOTE — ED PROVIDER NOTES
Cough  Pertinent negatives include no chest pain, no headaches, no rhinorrhea, no sore throat, no nausea and no vomiting. Patient is a 78-year-old male with past medical history significant for substance abuse, aggressive behavior, OCD and nicotine addiction who presents to the ED with a persistent congested cough for several days. Denies any fever, difficulty breathing, difficulty swallowing, SOB or chest pain. Denies any nausea, vomiting or diarrhea. Denies headache, neck pain, visual changes, focal weakness or rash. Pt. Reports taking multi-symptom over the counter cough remedies without relief. Past Medical History:   Diagnosis Date    Aggressive outburst     OCD (obsessive compulsive disorder)     Psychiatric disorder     Substance abuse ([facility]        No past surgical history on file. No family history on file. Social History     Socioeconomic History    Marital status: SINGLE     Spouse name: Not on file    Number of children: Not on file    Years of education: Not on file    Highest education level: Not on file   Occupational History    Not on file   Tobacco Use    Smoking status: Every Day     Packs/day: 0.50     Types: Cigarettes    Smokeless tobacco: Never   Substance and Sexual Activity    Alcohol use:  Yes     Alcohol/week: 21.0 standard drinks     Types: 21 Shots of liquor per week    Drug use: Yes     Types: Marijuana    Sexual activity: Never   Other Topics Concern     Service Not Asked    Blood Transfusions Not Asked    Caffeine Concern Not Asked    Occupational Exposure Not Asked    Hobby Hazards Not Asked    Sleep Concern Not Asked    Stress Concern Not Asked    Weight Concern Not Asked    Special Diet Not Asked    Back Care Not Asked    Exercise Not Asked    Bike Helmet Not Asked   [address] Not Asked    Self-Exams Not Asked   Social History Narrative    Not on file     Social Determinants of Health     Financial Resource Strain: Not on file Food Insecurity: Not on file   Transportation Needs: Not on file   Physical Activity: Not on file   Stress: Not on file   Social Connections: Not on file   Intimate Partner Violence: Not on file   Housing Stability: Not on file         ALLERGIES: Haldol [haloperidol lactate]    Review of Systems   Constitutional:  Negative for activity change, appetite change, fever and unexpected weight change. HENT:  Positive for congestion. Negative for rhinorrhea and sore throat. Eyes:  Negative for visual disturbance. Respiratory:  Positive for cough. Negative for chest tightness. Cardiovascular:  Negative for chest pain, palpitations and leg swelling. Gastrointestinal:  Negative for abdominal pain, diarrhea, nausea and vomiting. Genitourinary:  Negative for dysuria and flank pain. Musculoskeletal:  Negative for back pain and neck pain. Skin:  Negative for rash. Neurological:  Negative for dizziness, light-headedness and headaches. All other systems reviewed and are negative. Vitals:    08/22/22 1450   BP: 124/78   Pulse: 100   Resp: 20   Temp: 97 °F (36.1 °C)   SpO2: 96%            Physical Exam  Vitals and nursing note reviewed. Constitutional:       General: He is not in acute distress. Appearance: Normal appearance. He is normal weight. He is not ill-appearing, toxic-appearing or diaphoretic. HENT:      Head: Normocephalic. Mouth/Throat:      Mouth: Mucous membranes are moist.   Cardiovascular:      Rate and Rhythm: Normal rate and regular rhythm. Pulmonary:      Effort: Pulmonary effort is normal.      Breath sounds: Wheezing present. Comments: Intermittent harsh bronchospastic dry cough  Abdominal:      General: Bowel sounds are normal.      Palpations: Abdomen is soft. Musculoskeletal:         General: Normal range of motion. Cervical back: Normal range of motion and neck supple. Right lower leg: No edema. Left lower leg: No edema.    Lymphadenopathy: Cervical: No cervical adenopathy. Skin:     General: Skin is warm and dry. Findings: No bruising, erythema or rash. Neurological:      Mental Status: He is alert and oriented to person, place, and time. [facility]         Procedures      Labs Reviewed   SARS-COV-2   SARS-COV-2       XR CHEST PA LAT    Result Date: 8/22/2022  No acute cardiopulmonary disease. Patient has been reexamined and continues to have a harsh dry cough. Plan to treat with Z-Denis, short course of prednisone, albuterol metered-dose inhaler and Tessalon Perles. Recommend close follow-up with PCP if symptoms persist.  5:29 PM  Patient's results  and plan of care have been reviewed with him. Patient has verbally conveyed his understanding and agreement of his signs, symptoms, diagnosis, treatment and prognosis and additionally agrees to follow up as recommended or return to the Emergency Room should his condition change prior to follow-up. Discharge instructions have also been provided to the patient with some educational information regarding his diagnosis as well a list of reasons why he would want to return to the ER prior to his follow-up appointment should his condition change. Asha Garcia NP

## 2022-08-22 NOTE — LETTER
8/23/2022      Brian [address]  [address] [place] [address] [id number]      Dear Mr. Emily Patel were recently seen in the Emergency Department and had several tests performed as part of your evaluation. There are additional results, not available during your visit, that require your immediate attention. It is important that we discuss these results with you. Please call [phone] to speak with one of our providers as soon as possible. If you reach a voice mail, please leave your name, date of birth, phone number, and the best time to return your call.       Sincerely,    Shayy Hunt MD  Medical Director  [address] [place] [address] [facility] Aliyah Casillas, [address]   [phone]

## 2022-08-23 ENCOUNTER — PATIENT OUTREACH (OUTPATIENT)
Dept: CASE MANAGEMENT | Age: 42
End: 2022-08-23

## 2022-08-23 NOTE — PROGRESS NOTES
Attempted to reach patient. Number in chart with calling restrictions. Unable to connect or leave .   Will send registered letter

## 2022-09-10 ENCOUNTER — HOSPITAL ENCOUNTER (EMERGENCY)
Age: 42
Discharge: HOME | End: 2022-09-10
Attending: EMERGENCY MEDICINE

## 2022-09-10 VITALS
TEMPERATURE: 97.8 F | OXYGEN SATURATION: 97 % | HEART RATE: 93 BPM | RESPIRATION RATE: 18 BRPM | SYSTOLIC BLOOD PRESSURE: 139 MMHG | DIASTOLIC BLOOD PRESSURE: 90 MMHG

## 2022-09-10 DIAGNOSIS — F42.9 OBSESSIVE-COMPULSIVE DISORDER, UNSPECIFIED TYPE: ICD-10-CM

## 2022-09-10 DIAGNOSIS — Z76.0 MEDICATION REFILL: Primary | ICD-10-CM

## 2022-09-10 PROCEDURE — 99283 EMERGENCY DEPT VISIT LOW MDM: CPT

## 2022-09-10 RX ORDER — CLONAZEPAM 1 MG/1
1 TABLET ORAL 2 TIMES DAILY
Qty: 12 TABLET | Refills: 0 | Status: SHIPPED | OUTPATIENT
Start: 2022-09-10 | End: 2022-09-16

## 2022-09-10 NOTE — ED TRIAGE NOTES
Pt here because he ran out of clonozapam 1mg. Tried to get in contact with his provider and could not.

## 2022-09-10 NOTE — ED PROVIDER NOTES
57-year-old male with past medical history of aggressive outburst, OCD, substance abuse, panic attacks presents ambulatory with complaints of needing a medication refill. Patient is followed closely by Dr. Sam Stuart as primary care, patient states that he reached out to Dr. Sam Stuart and was unable to get a refill for his regular clonazepam 1 mg. And states that he ran out yesterday, will be able to get into see Dr. Sam Stuart this coming week. And asking for a few days to get by. Patient with no physical complaints at this time, denies fever, chills, chest pain, shortness of breath, denies feeling anxious currently. Past Medical History:   Diagnosis Date    Aggressive outburst     OCD (obsessive compulsive disorder)     Psychiatric disorder     Substance abuse ([facility] [address]        No past surgical history on file. No family history on file. Social History     Socioeconomic History    Marital status: SINGLE     Spouse name: Not on file    Number of children: Not on file    Years of education: Not on file    Highest education level: Not on file   Occupational History    Not on file   Tobacco Use    Smoking status: Every Day     Packs/day: 0.50     Types: Cigarettes    Smokeless tobacco: Never   Substance and Sexual Activity    Alcohol use:  Yes     Alcohol/week: 21.0 standard drinks     Types: 21 Shots of liquor per week    Drug use: Yes     Types: Marijuana    Sexual activity: Never   Other Topics Concern     Service Not Asked    Blood Transfusions Not Asked    Caffeine Concern Not Asked    Occupational Exposure Not Asked    Hobby Hazards Not Asked    Sleep Concern Not Asked    Stress Concern Not Asked    Weight Concern Not Asked    Special Diet Not Asked    Back Care Not Asked    Exercise Not Asked    Bike Helmet Not Asked    Seat Belt Not Asked    Self-Exams Not Asked   Social History Narrative    Not on file     Social Determinants of Health     Financial Resource Strain: Not on file   Food Insecurity: Not on file   Transportation Needs: Not on file   Physical Activity: Not on file   Stress: Not on file   Social Connections: Not on file   Intimate Partner Violence: Not on file   Housing Stability: Not on file         ALLERGIES: Haldol [haloperidol lactate]    Review of Systems   Constitutional: Negative. Respiratory:  Negative for shortness of breath. Cardiovascular:  Negative for chest pain. Gastrointestinal:  Negative for abdominal pain. Genitourinary:  Negative for difficulty urinating. Musculoskeletal: Negative. Skin: Negative. Allergic/Immunologic: Negative. Psychiatric/Behavioral:  The patient is not nervous/anxious. Vitals:    09/10/22 1011   BP: (!) 139/90   Pulse: 93   Resp: 18   Temp: 97.8 °F (36.6 °C)   SpO2: 97%            Physical Exam  Vitals and nursing note reviewed. Constitutional:       Appearance: Normal appearance. HENT:      Head: Normocephalic. Nose:      Comments: MAsk in place  Cardiovascular:      Rate and Rhythm: Normal rate. Pulmonary:      Effort: Pulmonary effort is normal. No respiratory distress. Abdominal:      General: There is no distension. Musculoskeletal:         General: Normal range of motion. Cervical back: Normal range of motion. Skin:     General: Skin is dry. Neurological:      Mental Status: He is alert and oriented to person, place, and time.    Psychiatric:         Mood and Affect: Mood normal.        MDM  Number of Diagnoses or Management Options  Medication refill: minor  Obsessive-compulsive disorder, unspecified type: minor  Diagnosis management comments: Differential DX: medication refill vs panic attack    Risk of Complications, Morbidity, and/or Mortality  Presenting problems: minimal  Diagnostic procedures: minimal  Management options: minimal    Patient Progress  Patient progress: stable         Procedures  VITAL SIGNS:  Patient Vitals for the past 4 hrs:   Temp Pulse Resp BP SpO2   09/10/22 1011 97.8 °F (36.6 °C) 93 18 (!) 139/90 97 %         LABS:  No results found for this or any previous visit (from the past 6 hour(s)). IMAGING:  No orders to display         Medications During Visit:  Medications - No data to display      DECISION MAKING:  Presley Kaplan is a 38 y.o. male who comes in as above. 37 y/o male past medical history of aggressive outburst, OCD, substance abuse, panic attacks presents ambulatory with complaints of needing a medication refill. Patient is followed closely by Dr. Barrett Hogan as primary care, patient states that he reached out to Dr. Barrett Hogan and was unable to get a refill for his regular clonazepam 1 mg. And states that he ran out yesterday, will be able to get into see Dr. Barrett Hogan this coming week. And asking for a few days to get by. Patient with no physical complaints at this time, denies fever, chills, chest pain, shortness of breath, denies feeling anxious currently.  reviewed:   Narc 150 Sed 371 Stim 000  RX filled consistently by Dr. Barrett Hogan, last rcvd Clonazepam, 1 mg tab, 60 tabs on 7/19/22. Patient states that he is already reached out to Dr. Barrett Hogan to schedule a follow-up appointment this week in order to get a refill. Patient is very calm, pleasant, vital signs are stable, not anxious at this time making clear logical sense and discussion, just really asking for refill only. Patient with no physical complaints today. Discussed with patient that I will refill the medication this 1 time, patient verbalized understanding agrees with plan. IMPRESSION:  1. Medication refill    2. Obsessive-compulsive disorder, unspecified type        DISPOSITION:  Discharged      Discharge Medication List as of 9/10/2022 10:45 AM        START taking these medications    Details   !! clonazePAM (KlonoPIN) 1 mg tablet Take 1 Tablet by mouth two (2) times a day for 6 days.  Max Daily Amount: 2 mg., Normal, Disp-12 Tablet, R-0       !! - Potential duplicate medications found. Please discuss with provider. CONTINUE these medications which have NOT CHANGED    Details   albuterol (PROVENTIL HFA, VENTOLIN HFA, PROAIR HFA) 90 mcg/actuation inhaler Take 2 Puffs by inhalation every four (4) hours as needed for Wheezing, Shortness of Breath or Cough. , Print, Disp-18 g, R-0      atorvastatin (LIPITOR) 80 mg tablet Take 1 Tablet by mouth daily. Indications: high amount of triglyceride in the blood, Print, Disp-30 Tablet, R-0      QUEtiapine (SEROquel) 200 mg tablet Take 1 Tablet by mouth two (2) times a day. Indications: mood, Print, Disp-60 Tablet, R-0      hydrOXYzine HCL (ATARAX) 50 mg tablet Take 1 Tablet by mouth three (3) times daily. Indications: anxious, Print, Disp-90 Tablet, R-0      !! escitalopram oxalate (LEXAPRO) 20 mg tablet Take 20 mg by mouth daily. , Historical Med      docusate sodium (COLACE) 100 mg capsule Take 100 mg by mouth two (2) times a day., Historical Med      guanFACINE IR (TENEX) 1 mg IR tablet Take 1 mg by mouth nightly., Historical Med      omeprazole (PRILOSEC) 40 mg capsule Take 40 mg by mouth daily. , Historical Med      tamsulosin (FLOMAX) 0.4 mg capsule Take 0.4 mg by mouth daily. , Historical Med      !! clonazePAM (KlonoPIN) 1 mg tablet Take 1 mg by mouth two (2) times a day., Historical Med      !! escitalopram oxalate (LEXAPRO) 10 mg tablet Take 10 mg by mouth daily. Indications: Anxiousness associated with Depression, Historical Med       !! - Potential duplicate medications found. Please discuss with provider. Follow-up Information       Follow up With Specialties Details Why Aleyda Goldman MD Psychiatry Call  Call first thing Monday morning to get into to see him this week for a refill. [address] [id number]  [phone]                The patient is asked to follow-up with their primary care provider in the next several days. They are to call tomorrow for an appointment.   The patient is asked to return promptly for any increased concerns or worsening of symptoms. They can return to this emergency department or any other emergency department.     Anel Andrews NP  11:03 AM

## 2023-06-26 ENCOUNTER — HOSPITAL ENCOUNTER (EMERGENCY)
Facility: HOSPITAL | Age: 43
Discharge: HOME | End: 2023-06-26
Attending: EMERGENCY MEDICINE

## 2023-06-26 VITALS
HEIGHT: 66 IN | WEIGHT: 208.11 LBS | DIASTOLIC BLOOD PRESSURE: 89 MMHG | RESPIRATION RATE: 20 BRPM | OXYGEN SATURATION: 97 % | HEART RATE: 94 BPM | SYSTOLIC BLOOD PRESSURE: 135 MMHG | BODY MASS INDEX: 33.45 KG/M2 | TEMPERATURE: 97.8 F

## 2023-06-26 DIAGNOSIS — F43.29 ADJUSTMENT DISORDER WITH OTHER SYMPTOM: Primary | ICD-10-CM

## 2023-06-26 PROCEDURE — 99282 EMERGENCY DEPT VISIT SF MDM: CPT

## 2023-06-26 PROCEDURE — 90791 PSYCH DIAGNOSTIC EVALUATION: CPT

## 2023-06-26 ASSESSMENT — ENCOUNTER SYMPTOMS
SORE THROAT: 0
CONSTIPATION: 0
SHORTNESS OF BREATH: 0

## 2023-06-26 ASSESSMENT — PAIN - FUNCTIONAL ASSESSMENT: PAIN_FUNCTIONAL_ASSESSMENT: NONE - DENIES PAIN

## 2023-07-18 ENCOUNTER — HOSPITAL ENCOUNTER (INPATIENT)
Facility: HOSPITAL | Age: 43
LOS: 2 days | Discharge: HOME | DRG: 882 | End: 2023-07-20
Attending: EMERGENCY MEDICINE | Admitting: PSYCHIATRY & NEUROLOGY
Payer: MEDICARE

## 2023-07-18 DIAGNOSIS — T63.444A BEE STING, UNDETERMINED INTENT, INITIAL ENCOUNTER: ICD-10-CM

## 2023-07-18 DIAGNOSIS — R45.851 SUICIDAL IDEATION: Primary | ICD-10-CM

## 2023-07-18 DIAGNOSIS — Z00.00 GENERAL MEDICAL EXAMINATION: ICD-10-CM

## 2023-07-18 LAB
ALBUMIN SERPL-MCNC: 3.9 G/DL (ref 3.5–5)
ALBUMIN/GLOB SERPL: 1.1 (ref 1.1–2.2)
ALP SERPL-CCNC: 97 U/L (ref 45–117)
ALT SERPL-CCNC: 52 U/L (ref 12–78)
AMPHET UR QL SCN: NEGATIVE
ANION GAP SERPL CALC-SCNC: 6 MMOL/L (ref 5–15)
APAP SERPL-MCNC: <2 UG/ML (ref 10–30)
APPEARANCE UR: CLEAR
AST SERPL-CCNC: 32 U/L (ref 15–37)
BACTERIA URNS QL MICRO: NEGATIVE /HPF
BARBITURATES UR QL SCN: NEGATIVE
BASOPHILS # BLD: 0.1 K/UL (ref 0–0.1)
BASOPHILS NFR BLD: 1 % (ref 0–1)
BENZODIAZ UR QL: NEGATIVE
BILIRUB SERPL-MCNC: 0.6 MG/DL (ref 0.2–1)
BILIRUB UR QL: NEGATIVE
BUN SERPL-MCNC: 14 MG/DL (ref 6–20)
BUN/CREAT SERPL: 13 (ref 12–20)
CALCIUM SERPL-MCNC: 9 MG/DL (ref 8.5–10.1)
CANNABINOIDS UR QL SCN: NEGATIVE
CHLORIDE SERPL-SCNC: 107 MMOL/L (ref 97–108)
CO2 SERPL-SCNC: 23 MMOL/L (ref 21–32)
COCAINE UR QL SCN: NEGATIVE
COLOR UR: NORMAL
CREAT SERPL-MCNC: 1.05 MG/DL (ref 0.7–1.3)
DIFFERENTIAL METHOD BLD: ABNORMAL
EOSINOPHIL # BLD: 0.1 K/UL (ref 0–0.4)
EOSINOPHIL NFR BLD: 2 % (ref 0–7)
EPITH CASTS URNS QL MICRO: NORMAL /LPF
ERYTHROCYTE [DISTWIDTH] IN BLOOD BY AUTOMATED COUNT: 12.6 % (ref 11.5–14.5)
ETHANOL SERPL-MCNC: <10 MG/DL (ref 0–0.08)
FLUAV RNA SPEC QL NAA+PROBE: NOT DETECTED
FLUBV RNA SPEC QL NAA+PROBE: NOT DETECTED
GLOBULIN SER CALC-MCNC: 3.4 G/DL (ref 2–4)
GLUCOSE SERPL-MCNC: 100 MG/DL (ref 65–100)
GLUCOSE UR STRIP.AUTO-MCNC: NEGATIVE MG/DL
HCT VFR BLD AUTO: 47 % (ref 36.6–50.3)
HGB BLD-MCNC: 16.1 G/DL (ref 12.1–17)
HGB UR QL STRIP: NEGATIVE
HYALINE CASTS URNS QL MICRO: NORMAL /LPF (ref 0–5)
IMM GRANULOCYTES # BLD AUTO: 0 K/UL (ref 0–0.04)
IMM GRANULOCYTES NFR BLD AUTO: 1 % (ref 0–0.5)
KETONES UR QL STRIP.AUTO: NEGATIVE MG/DL
LEUKOCYTE ESTERASE UR QL STRIP.AUTO: NEGATIVE
LYMPHOCYTES # BLD: 1.7 K/UL (ref 0.8–3.5)
LYMPHOCYTES NFR BLD: 25 % (ref 12–49)
Lab: NORMAL
MCH RBC QN AUTO: 31.1 PG (ref 26–34)
MCHC RBC AUTO-ENTMCNC: 34.3 G/DL (ref 30–36.5)
MCV RBC AUTO: 90.7 FL (ref 80–99)
METHADONE UR QL: NEGATIVE
MONOCYTES # BLD: 0.9 K/UL (ref 0–1)
MONOCYTES NFR BLD: 14 % (ref 5–13)
NEUTS SEG # BLD: 3.8 K/UL (ref 1.8–8)
NEUTS SEG NFR BLD: 57 % (ref 32–75)
NITRITE UR QL STRIP.AUTO: NEGATIVE
NRBC # BLD: 0 K/UL (ref 0–0.01)
NRBC BLD-RTO: 0 PER 100 WBC
OPIATES UR QL: NEGATIVE
PCP UR QL: NEGATIVE
PH UR STRIP: 6 (ref 5–8)
PLATELET # BLD AUTO: 188 K/UL (ref 150–400)
PMV BLD AUTO: 11.8 FL (ref 8.9–12.9)
POTASSIUM SERPL-SCNC: 4.2 MMOL/L (ref 3.5–5.1)
PROT SERPL-MCNC: 7.3 G/DL (ref 6.4–8.2)
PROT UR STRIP-MCNC: NEGATIVE MG/DL
RBC # BLD AUTO: 5.18 M/UL (ref 4.1–5.7)
RBC #/AREA URNS HPF: NORMAL /HPF (ref 0–5)
SALICYLATES SERPL-MCNC: 2.5 MG/DL (ref 2.8–20)
SARS-COV-2 RNA RESP QL NAA+PROBE: NOT DETECTED
SODIUM SERPL-SCNC: 136 MMOL/L (ref 136–145)
SP GR UR REFRACTOMETRY: 1.01 (ref 1–1.03)
SPECIMEN HOLD: NORMAL
UROBILINOGEN UR QL STRIP.AUTO: 0.2 EU/DL (ref 0.2–1)
WBC # BLD AUTO: 6.7 K/UL (ref 4.1–11.1)
WBC URNS QL MICRO: NORMAL /HPF (ref 0–4)

## 2023-07-18 PROCEDURE — 99285 EMERGENCY DEPT VISIT HI MDM: CPT

## 2023-07-18 PROCEDURE — 85025 COMPLETE CBC W/AUTO DIFF WBC: CPT

## 2023-07-18 PROCEDURE — 1240000000 HC EMOTIONAL WELLNESS R&B

## 2023-07-18 PROCEDURE — 80307 DRUG TEST PRSMV CHEM ANLYZR: CPT

## 2023-07-18 PROCEDURE — 80143 DRUG ASSAY ACETAMINOPHEN: CPT

## 2023-07-18 PROCEDURE — 36415 COLL VENOUS BLD VENIPUNCTURE: CPT

## 2023-07-18 PROCEDURE — 81001 URINALYSIS AUTO W/SCOPE: CPT

## 2023-07-18 PROCEDURE — 80053 COMPREHEN METABOLIC PANEL: CPT

## 2023-07-18 PROCEDURE — 87636 SARSCOV2 & INF A&B AMP PRB: CPT

## 2023-07-18 PROCEDURE — 80179 DRUG ASSAY SALICYLATE: CPT

## 2023-07-18 PROCEDURE — 82077 ASSAY SPEC XCP UR&BREATH IA: CPT

## 2023-07-18 PROCEDURE — 6370000000 HC RX 637 (ALT 250 FOR IP): Performed by: NURSE PRACTITIONER

## 2023-07-18 RX ORDER — POLYETHYLENE GLYCOL 3350 17 G/17G
17 POWDER, FOR SOLUTION ORAL DAILY PRN
Status: DISCONTINUED | OUTPATIENT
Start: 2023-07-18 | End: 2023-07-20 | Stop reason: HOSPADM

## 2023-07-18 RX ORDER — ESCITALOPRAM OXALATE 10 MG/1
20 TABLET ORAL DAILY
Status: DISCONTINUED | OUTPATIENT
Start: 2023-07-18 | End: 2023-07-19

## 2023-07-18 RX ORDER — CLONAZEPAM 1 MG/1
1 TABLET ORAL DAILY
Status: DISCONTINUED | OUTPATIENT
Start: 2023-07-19 | End: 2023-07-20 | Stop reason: HOSPADM

## 2023-07-18 RX ORDER — CLONAZEPAM 0.5 MG/1
0.5 TABLET ORAL
Status: DISCONTINUED | OUTPATIENT
Start: 2023-07-18 | End: 2023-07-20 | Stop reason: HOSPADM

## 2023-07-18 RX ORDER — SENNA PLUS 8.6 MG/1
1 TABLET ORAL DAILY PRN
Status: DISCONTINUED | OUTPATIENT
Start: 2023-07-18 | End: 2023-07-20 | Stop reason: HOSPADM

## 2023-07-18 RX ORDER — OLANZAPINE 2.5 MG/1
5 TABLET ORAL EVERY 6 HOURS PRN
Status: DISCONTINUED | OUTPATIENT
Start: 2023-07-18 | End: 2023-07-20 | Stop reason: HOSPADM

## 2023-07-18 RX ORDER — QUETIAPINE 200 MG/1
200 TABLET, FILM COATED, EXTENDED RELEASE ORAL DAILY
Status: DISCONTINUED | OUTPATIENT
Start: 2023-07-18 | End: 2023-07-20 | Stop reason: HOSPADM

## 2023-07-18 RX ORDER — ACETAMINOPHEN 325 MG/1
650 TABLET ORAL EVERY 4 HOURS PRN
Status: DISCONTINUED | OUTPATIENT
Start: 2023-07-18 | End: 2023-07-20 | Stop reason: HOSPADM

## 2023-07-18 RX ORDER — DIPHENHYDRAMINE HYDROCHLORIDE 50 MG/ML
50 INJECTION INTRAMUSCULAR; INTRAVENOUS EVERY 4 HOURS PRN
Status: DISCONTINUED | OUTPATIENT
Start: 2023-07-18 | End: 2023-07-20 | Stop reason: HOSPADM

## 2023-07-18 RX ORDER — MAGNESIUM HYDROXIDE/ALUMINUM HYDROXICE/SIMETHICONE 120; 1200; 1200 MG/30ML; MG/30ML; MG/30ML
30 SUSPENSION ORAL EVERY 6 HOURS PRN
Status: DISCONTINUED | OUTPATIENT
Start: 2023-07-18 | End: 2023-07-20 | Stop reason: HOSPADM

## 2023-07-18 RX ORDER — HYDROXYZINE 50 MG/1
50 TABLET, FILM COATED ORAL 3 TIMES DAILY PRN
Status: DISCONTINUED | OUTPATIENT
Start: 2023-07-18 | End: 2023-07-20 | Stop reason: HOSPADM

## 2023-07-18 RX ORDER — QUETIAPINE FUMARATE 100 MG/1
400 TABLET, FILM COATED ORAL
Status: DISCONTINUED | OUTPATIENT
Start: 2023-07-18 | End: 2023-07-20 | Stop reason: HOSPADM

## 2023-07-18 RX ORDER — TRAZODONE HYDROCHLORIDE 50 MG/1
50 TABLET ORAL NIGHTLY PRN
Status: DISCONTINUED | OUTPATIENT
Start: 2023-07-18 | End: 2023-07-20 | Stop reason: HOSPADM

## 2023-07-18 RX ADMIN — QUETIAPINE FUMARATE 400 MG: 100 TABLET ORAL at 20:23

## 2023-07-18 RX ADMIN — CLONAZEPAM 0.5 MG: 0.5 TABLET ORAL at 16:54

## 2023-07-18 RX ADMIN — CLONAZEPAM 0.5 MG: 0.5 TABLET ORAL at 20:23

## 2023-07-18 ASSESSMENT — SLEEP AND FATIGUE QUESTIONNAIRES
DO YOU USE A SLEEP AID: NO
SLEEP PATTERN: NORMAL
AVERAGE NUMBER OF SLEEP HOURS: 8
DO YOU HAVE DIFFICULTY SLEEPING: NO

## 2023-07-18 ASSESSMENT — ENCOUNTER SYMPTOMS
BLOOD IN STOOL: 0
STRIDOR: 0
DIARRHEA: 0
WHEEZING: 0
BACK PAIN: 0
NAUSEA: 0
ABDOMINAL PAIN: 0
COUGH: 0
SINUS PRESSURE: 0
VOMITING: 0
SHORTNESS OF BREATH: 0
COLOR CHANGE: 0
SINUS PAIN: 0
CHEST TIGHTNESS: 0
TROUBLE SWALLOWING: 0
SORE THROAT: 0
EYES NEGATIVE: 1
RHINORRHEA: 0

## 2023-07-18 ASSESSMENT — PAIN SCALES - GENERAL
PAINLEVEL_OUTOF10: 0
PAINLEVEL_OUTOF10: 0

## 2023-07-18 ASSESSMENT — PAIN - FUNCTIONAL ASSESSMENT: PAIN_FUNCTIONAL_ASSESSMENT: 0-10

## 2023-07-18 ASSESSMENT — LIFESTYLE VARIABLES
HOW MANY STANDARD DRINKS CONTAINING ALCOHOL DO YOU HAVE ON A TYPICAL DAY: PATIENT DOES NOT DRINK
HOW OFTEN DO YOU HAVE A DRINK CONTAINING ALCOHOL: NEVER
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
## 2023-07-18 NOTE — BSMART NOTE
Pt has been accepted to [facility] room 728/02 by AURELIO Farr on behalf Dr. Evgeny Parra. RN notified to call report pending medical clearance to Ext [phone].      Melany Tejeda LCSW

## 2023-07-18 NOTE — PLAN OF CARE
Problem: Anxiety  Goal: Will report anxiety at manageable levels  Description: INTERVENTIONS:  1. Administer medication as ordered  2. Teach and rehearse alternative coping skills  3. Provide emotional support with 1:1 interaction with staff  Outcome: Progressing  Flowsheets (Taken 7/18/2023 8857)  Will report anxiety at manageable levels:   Administer medication as ordered   Teach and rehearse alternative coping skills   Provide emotional support with 1:1 interaction with staff    [phone] - received pt in dining room. Pt concerned about PTA meds. Pt able to express that his needs appropriately, and to seek staff when anxiety arises. Denies SI/HI/AVH. Q15 min safety checks in place.

## 2023-07-18 NOTE — ED NOTES
TRANSFER - OUT REPORT:    Verbal report given to Whit on Ismael Ringer  being transferred to [address] [place] [address]  [address] for routine progression of patient care       Report consisted of patient's Situation, Background, Assessment and   Recommendations(SBAR). Information from the following report(s) ED SBAR, MAR, and Recent Results was reviewed with the receiving nurse. [place] Assessment:                           Lines:       Opportunity for questions and clarification was provided.       Patient transported with:  Lenin Apple RN  07/18/23 4329
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
## 2023-07-18 NOTE — BSMART NOTE
BSMART assessment completed, and suicide risk level noted to be low risk. Primary Nurse and Charge Nurse and Physician Dr Aleta Bartlett notified. Concerns not observed. Security/Off- has not been notified.      Melany Tejeda LCSW

## 2023-07-19 PROCEDURE — 1240000000 HC EMOTIONAL WELLNESS R&B

## 2023-07-19 PROCEDURE — 93005 ELECTROCARDIOGRAM TRACING: CPT | Performed by: NURSE PRACTITIONER

## 2023-07-19 PROCEDURE — 6370000000 HC RX 637 (ALT 250 FOR IP): Performed by: PSYCHIATRY & NEUROLOGY

## 2023-07-19 PROCEDURE — 6370000000 HC RX 637 (ALT 250 FOR IP): Performed by: NURSE PRACTITIONER

## 2023-07-19 RX ORDER — ESCITALOPRAM OXALATE 10 MG/1
10 TABLET ORAL ONCE
Status: COMPLETED | OUTPATIENT
Start: 2023-07-19 | End: 2023-07-19

## 2023-07-19 RX ORDER — GUANFACINE 1 MG/1
1 TABLET ORAL NIGHTLY
Status: DISCONTINUED | OUTPATIENT
Start: 2023-07-19 | End: 2023-07-20 | Stop reason: HOSPADM

## 2023-07-19 RX ORDER — DOCUSATE SODIUM 100 MG/1
100 CAPSULE, LIQUID FILLED ORAL 2 TIMES DAILY
Status: DISCONTINUED | OUTPATIENT
Start: 2023-07-19 | End: 2023-07-20 | Stop reason: HOSPADM

## 2023-07-19 RX ORDER — ATORVASTATIN CALCIUM 40 MG/1
80 TABLET, FILM COATED ORAL DAILY
Status: DISCONTINUED | OUTPATIENT
Start: 2023-07-19 | End: 2023-07-20 | Stop reason: HOSPADM

## 2023-07-19 RX ORDER — PANTOPRAZOLE SODIUM 40 MG/1
40 TABLET, DELAYED RELEASE ORAL
Status: DISCONTINUED | OUTPATIENT
Start: 2023-07-20 | End: 2023-07-20 | Stop reason: HOSPADM

## 2023-07-19 RX ORDER — QUETIAPINE 200 MG/1
200 TABLET, FILM COATED, EXTENDED RELEASE ORAL DAILY
COMMUNITY

## 2023-07-19 RX ORDER — ESCITALOPRAM OXALATE 10 MG/1
30 TABLET ORAL DAILY
Status: DISCONTINUED | OUTPATIENT
Start: 2023-07-20 | End: 2023-07-20 | Stop reason: HOSPADM

## 2023-07-19 RX ORDER — NICOTINE 21 MG/24HR
1 PATCH, TRANSDERMAL 24 HOURS TRANSDERMAL DAILY
Status: DISCONTINUED | OUTPATIENT
Start: 2023-07-19 | End: 2023-07-20 | Stop reason: HOSPADM

## 2023-07-19 RX ORDER — TAMSULOSIN HYDROCHLORIDE 0.4 MG/1
0.4 CAPSULE ORAL DAILY
Status: DISCONTINUED | OUTPATIENT
Start: 2023-07-19 | End: 2023-07-20 | Stop reason: HOSPADM

## 2023-07-19 RX ADMIN — ESCITALOPRAM OXALATE 20 MG: 10 TABLET ORAL at 08:12

## 2023-07-19 RX ADMIN — CLONAZEPAM 0.5 MG: 0.5 TABLET ORAL at 20:34

## 2023-07-19 RX ADMIN — CLONAZEPAM 1 MG: 1 TABLET ORAL at 08:11

## 2023-07-19 RX ADMIN — ESCITALOPRAM OXALATE 10 MG: 10 TABLET ORAL at 11:57

## 2023-07-19 RX ADMIN — ATORVASTATIN CALCIUM 80 MG: 40 TABLET, FILM COATED ORAL at 11:58

## 2023-07-19 RX ADMIN — TAMSULOSIN HYDROCHLORIDE 0.4 MG: 0.4 CAPSULE ORAL at 11:58

## 2023-07-19 RX ADMIN — HYDROXYZINE HYDROCHLORIDE 50 MG: 50 TABLET, FILM COATED ORAL at 07:29

## 2023-07-19 RX ADMIN — HYDROXYZINE HYDROCHLORIDE 50 MG: 50 TABLET, FILM COATED ORAL at 22:05

## 2023-07-19 RX ADMIN — DOCUSATE SODIUM 100 MG: 100 CAPSULE, LIQUID FILLED ORAL at 11:58

## 2023-07-19 RX ADMIN — CLONAZEPAM 0.5 MG: 0.5 TABLET ORAL at 17:14

## 2023-07-19 RX ADMIN — QUETIAPINE FUMARATE 400 MG: 100 TABLET ORAL at 20:37

## 2023-07-19 RX ADMIN — DOCUSATE SODIUM 100 MG: 100 CAPSULE, LIQUID FILLED ORAL at 20:39

## 2023-07-19 RX ADMIN — QUETIAPINE FUMARATE 200 MG: 200 TABLET, EXTENDED RELEASE ORAL at 08:12

## 2023-07-19 RX ADMIN — GUANFACINE 1 MG: 1 TABLET ORAL at 20:34

## 2023-07-19 NOTE — PLAN OF CARE
[facility]  Master Treatment Plan for Brittany Prakash    Date Treatment Plan Initiated: 7/19/23    Treatment Plan Modalities:  Type of Modality Amount  (x minutes) Frequency (x/week) Duration (x days) Name of Responsible Staff   [address] [facility],8W meetings to encourage peer interactions 15 7 1 CINDY EAGLE     Group psychotherapy to assist in building coping skills and internal controls 60 7 1 Mandy Sy MSW   Therapeutic activity groups to build coping skills 60 7 1 Mandy Sy [facility]   Psychoeducation in group setting to address:   Medication education   [address] [facility] skills   [address], Kamila Blakely   Relaxation techniques      SAW [facility]   Symptom management      STAFF   Discharge planning   60 2 1 Yas Barajas   Spirituality    61 2 1 Chaplain SALVADOR   61 1 1 volunteer   Recovery/AA/NA      volunteer   Physician medication management   15 7 1 DR VALDEZ/ JIM NP   Family meeting/discharge planning   15 2 1 Steffanie Douglas                                  Problem: Self Harm/Suicidality  Goal: Will have no self-injury during hospital stay  Description: INTERVENTIONS:  1. Ensure constant observer at bedside with Q15M safety checks  2. Maintain a safe environment  3. Secure patient belongings  4. Ensure family/visitors adhere to safety recommendations  5. Ensure safety tray has been added to patient's diet order  6. Every shift and PRN: Re-assess suicidal risk via Frequent Screener    Outcome: Progressing  NO OBSERVATION OF SELF INJURY OBSERVED. PATIENT DENIES ANY THOUGHTS AT THIS TIME. Problem: Anxiety  Goal: Will report anxiety at manageable levels  Description: INTERVENTIONS:  1. Administer medication as ordered  2. Teach and rehearse alternative coping skills  3. Provide emotional support with 1:1 interaction with staff  Outcome: Not Progressing   SELF REPORTS ANXIETY AND WILL SEEK STAFF FOR MEDICATION. WAS RELUCTANT TO
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
## 2023-07-20 VITALS
RESPIRATION RATE: 16 BRPM | DIASTOLIC BLOOD PRESSURE: 80 MMHG | SYSTOLIC BLOOD PRESSURE: 118 MMHG | HEART RATE: 85 BPM | OXYGEN SATURATION: 94 % | HEIGHT: 68 IN | WEIGHT: 200 LBS | BODY MASS INDEX: 30.31 KG/M2 | TEMPERATURE: 97.5 F

## 2023-07-20 LAB
EKG ATRIAL RATE: 70 BPM
EKG DIAGNOSIS: NORMAL
EKG P AXIS: 53 DEGREES
EKG P-R INTERVAL: 154 MS
EKG Q-T INTERVAL: 378 MS
EKG QRS DURATION: 76 MS
EKG QTC CALCULATION (BAZETT): 408 MS
EKG R AXIS: 71 DEGREES
EKG T AXIS: 54 DEGREES
EKG VENTRICULAR RATE: 70 BPM

## 2023-07-20 PROCEDURE — 6370000000 HC RX 637 (ALT 250 FOR IP): Performed by: NURSE PRACTITIONER

## 2023-07-20 PROCEDURE — 6370000000 HC RX 637 (ALT 250 FOR IP): Performed by: PSYCHIATRY & NEUROLOGY

## 2023-07-20 PROCEDURE — 93010 ELECTROCARDIOGRAM REPORT: CPT | Performed by: SPECIALIST

## 2023-07-20 RX ADMIN — PANTOPRAZOLE SODIUM 40 MG: 40 TABLET, DELAYED RELEASE ORAL at 06:15

## 2023-07-20 RX ADMIN — DOCUSATE SODIUM 100 MG: 100 CAPSULE, LIQUID FILLED ORAL at 08:45

## 2023-07-20 RX ADMIN — QUETIAPINE FUMARATE 200 MG: 200 TABLET, EXTENDED RELEASE ORAL at 08:47

## 2023-07-20 RX ADMIN — ATORVASTATIN CALCIUM 80 MG: 40 TABLET, FILM COATED ORAL at 08:41

## 2023-07-20 RX ADMIN — CLONAZEPAM 1 MG: 1 TABLET ORAL at 08:40

## 2023-07-20 RX ADMIN — TAMSULOSIN HYDROCHLORIDE 0.4 MG: 0.4 CAPSULE ORAL at 08:40

## 2023-07-20 RX ADMIN — ESCITALOPRAM OXALATE 30 MG: 10 TABLET ORAL at 08:41

## 2023-07-20 NOTE — PLAN OF CARE
DISCHARGE SUMMARY from Nurse    PATIENT INSTRUCTIONS:    What to do at Home:  Recommended activity: activity as tolerated,       *  Please give a list of your current medications to your Primary Care Provider. *  Please update this list whenever your medications are discontinued, doses are      changed, or new medications (including over-the-counter products) are added. *  Please carry medication information at all times in case of emergency situations. These are general instructions for a healthy lifestyle:    No smoking/ No tobacco products/ Avoid exposure to second hand smoke  Surgeon General's Warning:  Quitting smoking now greatly reduces serious risk to your health. Obesity, smoking, and sedentary lifestyle greatly increases your risk for illness    A healthy diet, regular physical exercise & weight monitoring are important for maintaining a healthy lifestyle    You may be retaining fluid if you have a history of heart failure or if you experience any of the following symptoms:  Weight gain of 3 pounds or more overnight or 5 pounds in a week, increased swelling in our hands or feet or shortness of breath while lying flat in bed. Please call your doctor as soon as you notice any of these symptoms; do not wait until your next office visit. The discharge information has been reviewed with the patient. The patient verbalized understanding. Discharge medications reviewed with the patient and appropriate educational materials and side effects teaching were provided. Plan is for discharge   ___________________________________________________________________________________________________________________________________  Problem: Discharge Planning  Goal: Discharge to home or other facility with appropriate resources  Outcome: Adequate for Discharge  Plan is for discharge home with use of his car which is parked here. Patient has his medications at home which has not changed.

## 2023-07-20 NOTE — DISCHARGE INSTRUCTIONS
DISCHARGE SUMMARY    NAME:Cristhian Rodriguez Needs  : 1980  MRN: [id number]    The patient Marcus Graff exhibits the ability to control behavior in a less restrictive environment. Patient's level of functioning is improving. No assaultive/destructive behavior has been observed for the past 24 hours. No suicidal/homicidal threat or behavior has been observed for the past 24 hours. There is no evidence of serious medication side effects. Patient has not been in physical or protective restraints for at least the past 24 hours. If weapons involved, how are they secured? None    Is patient aware of and in agreement with discharge plan? Yes    Arrangements for medication:  Prescriptions not filled at this time, patient has prescriptions at home    Copy of discharge instructions to provider?:  Yes    Arrangements for transportation home: Self    Keep all follow up appointments as scheduled, continue to take prescribed medications per physician instructions. Mental health crisis number:  607 or your local mental health crisis line number at [address] [facility] [address] at [address] [facility] WARM LINE      [phone] ([phone])      M-F: 9am to 9pm      Sat & Sun: [facility] [facility] suicide prevention lines:                             5-696-ATTBYFP ([phone])       2-533-391-TALK ([phone])    Crisis Text Line:  Text HOME to [address] [place] [address] from Nurse    PATIENT INSTRUCTIONS:      What to do at Home:  Recommended activity: activity as tolerated,     If you experience any of the following symptoms: intrusive thoughts that lead to increase in urge to self fires or engage in impulsive or dangerous behaviors - call your local crisis number at [phone]. *  Please give a list of your current medications to your Primary Care Provider.     *  Please update this list whenever your medications are discontinued, doses are      changed, or new medications (including over-the-counter
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
## 2023-07-20 NOTE — PLAN OF CARE
Problem: Safety - Adult  Goal: Free from fall injury  7/20/2023 0012 by Ivania Rebolledo RN  Outcome: Progressing     Problem: Anxiety  Goal: Will report anxiety at manageable levels  Description: INTERVENTIONS:  1. Administer medication as ordered  2. Teach and rehearse alternative coping skills  3.  Provide emotional support with 1:1 interaction with staff  7/20/2023 0012 by Ivania Rebolledo RN  Outcome: Progressing

## 2023-07-20 NOTE — INTERDISCIPLINARY ROUNDS
Behavioral Health Interdisciplinary Rounds     Patient Name: Pito Rodriguez  Age: 37 y.o. Room/Bed:  728/01  Primary Diagnosis: Schizoaffective disorder ([address])   Admission Status: Voluntary    Readmission within 30 days: No  Power of  in place: No  Patient requires a blocked bed: No          Reason for blocked bed:   Sleep hours: 7+      Participation in Care/Groups:  Yes  Medication Compliant?: Yes  PRNS (last 24 hours): Antianxiety   Restraints (last 24 hours):  No  __________________________________________________  OQ Admission Analysis Survey completed:  OQ Admission Analysis Survey score:  __________________________________________________     Alcohol screening (AUDIT) completed -     If applicable, date SBIRT discussed in treatment team AND documented:    Tobacco - patient is a smoker:    Illegal Drugs use:      24 hour chart check complete: Yes    _______________________________________________    Patient goal(s) for today:   Treatment team focus/goals:   Progress note: Patient met with treatment team and appeared with a fair mood and affect. Patient denies SI/HI and WOODS AT [facility], reports fair sleep and appetite and is actively attending groups. Patient is boarding on general unit and is tolerating well. Patient met with team and requested discharge today, treatment team in agreement, no medication changes made at this time. SW made referral to [facility] and provided patient with  phone number from [facility]. Patient is to start with [facility] soon. Plan to discharge home this afternoon, patient has car at hospital and will drive himself home, no medications filled due to no changes to regiment.      Spiritual Care Consult:   Financial concerns/prescription coverage:    Family contact:                        Family requesting physician contact today:    Discharge plan:   Access to weapons :                                                              Outpatient provider(s):     LOS:

## 2023-08-14 ENCOUNTER — HOSPITAL ENCOUNTER (EMERGENCY)
Facility: HOSPITAL | Age: 43
Discharge: HOME | End: 2023-08-14
Attending: EMERGENCY MEDICINE
Payer: MEDICARE

## 2023-08-14 VITALS
BODY MASS INDEX: 31.64 KG/M2 | OXYGEN SATURATION: 97 % | RESPIRATION RATE: 17 BRPM | SYSTOLIC BLOOD PRESSURE: 127 MMHG | DIASTOLIC BLOOD PRESSURE: 86 MMHG | HEART RATE: 91 BPM | TEMPERATURE: 98.8 F | WEIGHT: 208.11 LBS

## 2023-08-14 DIAGNOSIS — Z76.0 MEDICATION REFILL: Primary | ICD-10-CM

## 2023-08-14 PROCEDURE — 99282 EMERGENCY DEPT VISIT SF MDM: CPT

## 2023-08-14 ASSESSMENT — ENCOUNTER SYMPTOMS
COLOR CHANGE: 0
ABDOMINAL PAIN: 0
SHORTNESS OF BREATH: 0
TROUBLE SWALLOWING: 0

## 2023-08-14 ASSESSMENT — PAIN - FUNCTIONAL ASSESSMENT: PAIN_FUNCTIONAL_ASSESSMENT: NONE - DENIES PAIN

## 2023-08-14 NOTE — ED TRIAGE NOTES
Pt presents to ED requesting \"temporary prescription for ativan for my panic attacks\". Pt states his psychiatrist his \"unreachable\" but has an appointment on 8/22 to meet with him. Pt states he has attempted to reach them but he \"really needs my ativan so I don't have a panic attack. You guys have done it for me before\". Pt states he took his klonopin this morning \"to fix it but i'm worried about tomorrow or the next day when I don't have it\". Pt denies chest pain, SOB, n/v, dizziness, pt denies SI/HI.

## 2023-08-14 NOTE — ED PROVIDER NOTES
[facility]      Pt Name: Justice Jeroniom  MRN: [id number]  [address] [facility] 1980  Date of evaluation: 8/14/2023  Provider: Joshua Rogers, [address] [facility]       Chief Complaint   Patient presents with    Medication Refill         HISTORY OF PRESENT ILLNESS   (Location/Symptom, Timing/Onset, Context/Setting, Quality, Duration, Modifying Factors, Severity)  Note limiting factors. Justice Jeronimo is a 37 y.o. male who presents to the emergency department requesting a medication refill for his PRN ativan. He stated his doctor is out of town and he states they would no call in a new prescription. Appointment with doctor on 8/22. Patient reports he is feeling \"antsy\" right now. Patient takes klonipin daily. Patient reports that he previously took Vistaril and it did not help his symptoms. Patient denies acute anxiety symptoms right now. The history is provided by the patient. No  was used. Review of External Medical Records:     Nursing Notes were reviewed. REVIEW OF SYSTEMS    (2-9 systems for level 4, 10 or more for level 5)     Review of Systems   Constitutional:  Negative for fever. HENT:  Negative for trouble swallowing. Eyes:  Negative for visual disturbance. Respiratory:  Negative for shortness of breath. Cardiovascular:  Negative for chest pain. Gastrointestinal:  Negative for abdominal pain. Genitourinary:  Negative for difficulty urinating. Musculoskeletal:  Negative for gait problem. Skin:  Negative for color change. Neurological:  Negative for speech difficulty. Except as noted above the remainder of the review of systems was reviewed and negative. PAST MEDICAL HISTORY     Past Medical History:   Diagnosis Date    Aggressive outburst     OCD (obsessive compulsive disorder)     Psychiatric disorder     Substance abuse ([address])          SURGICAL HISTORY     No past surgical history on file.       CURRENT

## 2024-02-12 ENCOUNTER — HOSPITAL ENCOUNTER (EMERGENCY)
Facility: HOSPITAL | Age: 44
Discharge: HOME | End: 2024-02-12
Attending: EMERGENCY MEDICINE

## 2024-02-12 ENCOUNTER — APPOINTMENT (OUTPATIENT)
Facility: HOSPITAL | Age: 44
End: 2024-02-12

## 2024-02-12 VITALS
TEMPERATURE: 98.4 F | RESPIRATION RATE: 18 BRPM | HEART RATE: 96 BPM | SYSTOLIC BLOOD PRESSURE: 136 MMHG | WEIGHT: 233.25 LBS | OXYGEN SATURATION: 98 % | BODY MASS INDEX: 35.35 KG/M2 | HEIGHT: 68 IN | DIASTOLIC BLOOD PRESSURE: 84 MMHG

## 2024-02-12 DIAGNOSIS — S93.601A RIGHT FOOT SPRAIN, INITIAL ENCOUNTER: Primary | ICD-10-CM

## 2024-02-12 PROCEDURE — 99284 EMERGENCY DEPT VISIT MOD MDM: CPT

## 2024-02-12 PROCEDURE — 6360000002 HC RX W HCPCS: Performed by: PHYSICIAN ASSISTANT

## 2024-02-12 PROCEDURE — 73630 X-RAY EXAM OF FOOT: CPT

## 2024-02-12 PROCEDURE — 96372 THER/PROPH/DIAG INJ SC/IM: CPT

## 2024-02-12 RX ORDER — KETOROLAC TROMETHAMINE 30 MG/ML
30 INJECTION, SOLUTION INTRAMUSCULAR; INTRAVENOUS
Status: COMPLETED | OUTPATIENT
Start: 2024-02-12 | End: 2024-02-12

## 2024-02-12 RX ORDER — KETOROLAC TROMETHAMINE 10 MG/1
10 TABLET, FILM COATED ORAL EVERY 8 HOURS PRN
Qty: 12 TABLET | Refills: 0 | Status: SHIPPED | OUTPATIENT
Start: 2024-02-12

## 2024-02-12 RX ADMIN — KETOROLAC TROMETHAMINE 30 MG: 30 INJECTION INTRAMUSCULAR; INTRAVENOUS at 22:28
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
## 2024-02-13 NOTE — DISCHARGE INSTRUCTIONS
Return for new or worsening symptoms.  Thankfully, your x-ray was negative for any broken bones or dislocation.  Wear your postop shoe to help the foot to heal.  Follow-up with Dr. Goldberg if not improving.  Do not take any additional NSAIDs such as ibuprofen, Motrin, Advil, Aleve, naproxen with prescribed medication.

## 2024-02-19 ENCOUNTER — HOSPITAL ENCOUNTER (EMERGENCY)
Facility: HOSPITAL | Age: 44
Discharge: HOME | End: 2024-02-19
Attending: STUDENT IN AN ORGANIZED HEALTH CARE EDUCATION/TRAINING PROGRAM
Payer: MEDICARE

## 2024-02-19 VITALS
RESPIRATION RATE: 17 BRPM | DIASTOLIC BLOOD PRESSURE: 97 MMHG | HEART RATE: 90 BPM | OXYGEN SATURATION: 96 % | SYSTOLIC BLOOD PRESSURE: 147 MMHG | TEMPERATURE: 97.8 F

## 2024-02-19 DIAGNOSIS — S93.601A RIGHT FOOT SPRAIN, INITIAL ENCOUNTER: Primary | ICD-10-CM

## 2024-02-19 PROCEDURE — 99283 EMERGENCY DEPT VISIT LOW MDM: CPT

## 2024-02-19 RX ORDER — KETOROLAC TROMETHAMINE 10 MG/1
10 TABLET, FILM COATED ORAL EVERY 6 HOURS PRN
Qty: 12 TABLET | Refills: 0 | Status: SHIPPED | OUTPATIENT
Start: 2024-02-19

## 2024-02-19 ASSESSMENT — PAIN - FUNCTIONAL ASSESSMENT: PAIN_FUNCTIONAL_ASSESSMENT: 0-10

## 2024-02-19 ASSESSMENT — PAIN SCALES - GENERAL
PAINLEVEL_OUTOF10: 5
PAINLEVEL_OUTOF10: 3

## 2024-02-20 NOTE — DISCHARGE INSTRUCTIONS
Return for new or worsening symptoms.  Use medication as directed.  Wear supportive shoes.  Follow-up with Ortho foot/ankle or podiatry for continued symptoms.
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
## 2024-06-25 ENCOUNTER — HOSPITAL ENCOUNTER (EMERGENCY)
Facility: HOSPITAL | Age: 44
Discharge: HOME | End: 2024-06-25
Attending: EMERGENCY MEDICINE

## 2024-06-25 ENCOUNTER — APPOINTMENT (OUTPATIENT)
Facility: HOSPITAL | Age: 44
End: 2024-06-25

## 2024-06-25 VITALS
TEMPERATURE: 98.4 F | WEIGHT: 216.71 LBS | SYSTOLIC BLOOD PRESSURE: 103 MMHG | BODY MASS INDEX: 32.95 KG/M2 | DIASTOLIC BLOOD PRESSURE: 69 MMHG | HEART RATE: 97 BPM | RESPIRATION RATE: 20 BRPM | OXYGEN SATURATION: 96 %

## 2024-06-25 DIAGNOSIS — M25.572 CHRONIC PAIN OF BOTH ANKLES: Primary | ICD-10-CM

## 2024-06-25 DIAGNOSIS — M79.651 BILATERAL THIGH PAIN: ICD-10-CM

## 2024-06-25 DIAGNOSIS — M79.672 BILATERAL FOOT PAIN: ICD-10-CM

## 2024-06-25 DIAGNOSIS — G89.29 CHRONIC PAIN OF BOTH ANKLES: Primary | ICD-10-CM

## 2024-06-25 DIAGNOSIS — M79.652 BILATERAL THIGH PAIN: ICD-10-CM

## 2024-06-25 DIAGNOSIS — M25.571 CHRONIC PAIN OF BOTH ANKLES: Primary | ICD-10-CM

## 2024-06-25 DIAGNOSIS — M79.671 BILATERAL FOOT PAIN: ICD-10-CM

## 2024-06-25 PROCEDURE — 73610 X-RAY EXAM OF ANKLE: CPT

## 2024-06-25 PROCEDURE — 73630 X-RAY EXAM OF FOOT: CPT

## 2024-06-25 PROCEDURE — 99283 EMERGENCY DEPT VISIT LOW MDM: CPT

## 2024-06-25 PROCEDURE — 72170 X-RAY EXAM OF PELVIS: CPT

## 2024-06-25 ASSESSMENT — PAIN - FUNCTIONAL ASSESSMENT: PAIN_FUNCTIONAL_ASSESSMENT: 0-10

## 2024-06-25 ASSESSMENT — PAIN DESCRIPTION - DESCRIPTORS: DESCRIPTORS: ACHING

## 2024-06-25 ASSESSMENT — PAIN DESCRIPTION - ORIENTATION: ORIENTATION: RIGHT;LEFT;POSTERIOR

## 2024-06-25 ASSESSMENT — PAIN SCALES - GENERAL: PAINLEVEL_OUTOF10: 10

## 2024-06-25 ASSESSMENT — PAIN DESCRIPTION - LOCATION: LOCATION: LEG

## 2024-06-25 ASSESSMENT — PAIN DESCRIPTION - PAIN TYPE: TYPE: ACUTE PAIN

## 2024-06-25 NOTE — ED TRIAGE NOTES
Pt c/o bilateral posterior leg pain that radiates to bilateral feet. Pt reports pain has been ongoing and increases when driving for extended periods of time.

## 2024-06-25 NOTE — ED PROVIDER NOTES
topically 4 times daily, Topical, 4 TIMES DAILY Starting Tue 6/25/2024, Disp-50 g, R-0, Normal           Controlled Substances Monitoring:          No data to display                (Please note that portions of this note were completed with a voice recognition program.  Efforts were made to edit the dictations but occasionally words are mis-transcribed.)    Farshad Michelle PA-C (electronically signed)  Physician Assistant            Farshad Michelle PA-C  06/25/24 [id number]

## 2024-06-25 NOTE — DISCHARGE INSTRUCTIONS
Discussed visit today. Please follow-up with PCP or [facility] for further evaluation. RICE - rest, ice, compression with brace or ACE wrap, and elevation.     Return to the ER with any worsening of symptoms.

## 2024-07-20 ENCOUNTER — HOSPITAL ENCOUNTER (EMERGENCY)
Facility: HOSPITAL | Age: 44
Discharge: HOME | End: 2024-07-20
Attending: STUDENT IN AN ORGANIZED HEALTH CARE EDUCATION/TRAINING PROGRAM
Payer: MEDICARE

## 2024-07-20 VITALS
SYSTOLIC BLOOD PRESSURE: 118 MMHG | HEART RATE: 98 BPM | TEMPERATURE: 98 F | DIASTOLIC BLOOD PRESSURE: 77 MMHG | BODY MASS INDEX: 32.58 KG/M2 | OXYGEN SATURATION: 95 % | HEIGHT: 68 IN | RESPIRATION RATE: 18 BRPM | WEIGHT: 214.95 LBS

## 2024-07-20 DIAGNOSIS — M79.671 PAIN IN BOTH FEET: Primary | ICD-10-CM

## 2024-07-20 DIAGNOSIS — M79.672 PAIN IN BOTH FEET: Primary | ICD-10-CM

## 2024-07-20 PROCEDURE — 99283 EMERGENCY DEPT VISIT LOW MDM: CPT

## 2024-07-20 ASSESSMENT — PAIN DESCRIPTION - FREQUENCY: FREQUENCY: CONTINUOUS

## 2024-07-20 ASSESSMENT — PAIN SCALES - GENERAL: PAINLEVEL_OUTOF10: 8

## 2024-07-20 ASSESSMENT — PAIN DESCRIPTION - DESCRIPTORS: DESCRIPTORS: ACHING

## 2024-07-20 ASSESSMENT — PAIN - FUNCTIONAL ASSESSMENT: PAIN_FUNCTIONAL_ASSESSMENT: PREVENTS OR INTERFERES SOME ACTIVE ACTIVITIES AND ADLS

## 2024-07-20 ASSESSMENT — PAIN DESCRIPTION - ONSET: ONSET: ON-GOING

## 2024-07-20 ASSESSMENT — PAIN DESCRIPTION - ORIENTATION: ORIENTATION: LEFT;RIGHT

## 2024-07-20 ASSESSMENT — PAIN DESCRIPTION - PAIN TYPE: TYPE: ACUTE PAIN

## 2024-07-20 NOTE — ED TRIAGE NOTES
Pt c/o chronic bilateral foot pain. Pt concerned that he is running out of diclofenac cream and requesting refill as well as referral for follow up

## 2024-07-20 NOTE — DISCHARGE INSTRUCTIONS
Return to the ER for new or worsening symptoms such as swelling, color changes, fever, wounds, etc.  As we discussed, the pain in your feet could be caused by multiple different things-it could be nerve inflammation or neuropathy, inflammation of the tendons, arthritis, etc.  It is important that you follow-up with either a podiatrist or an orthopedic physician who specializes in the foot/ankle to receive a more definitive diagnosis and treatment plan.

## 2024-07-20 NOTE — BSMART NOTE
Nurse requested resources for patient which were provided by this counselor. A Bsmart is unnecessary at this time as patient denies SI/HI and A/V/H.

## 2024-07-20 NOTE — ED PROVIDER NOTES
[facility] EMERGENCY DEP  EMERGENCY DEPARTMENT ENCOUNTER      Pt Name: Cristhian Washington  MRN: [id number]  Birthdate 1980  Date of evaluation: 7/20/2024  Provider: LIDIA Crowley    CHIEF COMPLAINT       Chief Complaint   Patient presents with    Foot Pain         HISTORY OF PRESENT ILLNESS   (Location/Symptom, Timing/Onset, Context/Setting, Quality, Duration, Modifying Factors, Severity)  Note limiting factors.     44-year-old male presenting to the ED for bilateral foot pain.  Ongoing for months.  Notes that his first visit for was here in February.  No injury.  Right equal to left.  Denies any fever or wounds.  Patient reports that he was previously prescribed topical Voltaren which seemed to help his symptoms.  Has not followed up.    PMHx: list UTD per patient  Psx: Denies  Social: + smoker.  No alcohol.  No drugs.  Not working currently.  Allergies UTD.              Review of External Medical Records:     Nursing Notes were reviewed.    REVIEW OF SYSTEMS    (2-9 systems for level 4, 10 or more for level 5)     Review of Systems    Except as noted above the remainder of the review of systems was reviewed and negative.       PAST MEDICAL HISTORY     Past Medical History:   Diagnosis Date    Aggressive outburst     OCD (obsessive compulsive disorder)     Psychiatric disorder     Substance abuse (HCC)          SURGICAL HISTORY     No past surgical history on file.      CURRENT MEDICATIONS       Discharge Medication List as of 7/20/2024  2:40 PM        CONTINUE these medications which have NOT CHANGED    Details   !! diclofenac sodium (VOLTAREN) 1 % GEL Apply 4 g topically 4 times daily, Topical, 4 TIMES DAILY Starting Tue 6/25/2024, Disp-50 g, R-0, Normal      ketorolac (TORADOL) 10 MG tablet Take 1 tablet by mouth every 6 hours as needed for Pain, Disp-12 tablet, R-0Normal      QUEtiapine (SEROQUEL XR) 200 MG extended release tablet Take 1 tablet by mouth dailyHistorical Med      atorvastatin (LIPITOR) 80

## 2025-05-06 ENCOUNTER — HOSPITAL ENCOUNTER (EMERGENCY)
Facility: HOSPITAL | Age: 45
Discharge: LEFT BEFORE DISCHARGE | End: 2025-05-06
Attending: EMERGENCY MEDICINE
Payer: MEDICARE

## 2025-05-06 VITALS
SYSTOLIC BLOOD PRESSURE: 130 MMHG | HEART RATE: 110 BPM | BODY MASS INDEX: 35.6 KG/M2 | TEMPERATURE: 98.4 F | OXYGEN SATURATION: 95 % | DIASTOLIC BLOOD PRESSURE: 87 MMHG | RESPIRATION RATE: 18 BRPM | WEIGHT: 234.13 LBS

## 2025-05-06 DIAGNOSIS — R00.0 TACHYCARDIA: ICD-10-CM

## 2025-05-06 DIAGNOSIS — F32.A DEPRESSION, UNSPECIFIED DEPRESSION TYPE: ICD-10-CM

## 2025-05-06 DIAGNOSIS — Z53.21 ELOPED FROM EMERGENCY DEPARTMENT: Primary | ICD-10-CM

## 2025-05-06 LAB
COMMENT:: NORMAL
ETHANOL SERPL-MCNC: <10 MG/DL (ref 0–0.08)
SPECIMEN HOLD: NORMAL

## 2025-05-06 PROCEDURE — 99283 EMERGENCY DEPT VISIT LOW MDM: CPT

## 2025-05-06 PROCEDURE — 82077 ASSAY SPEC XCP UR&BREATH IA: CPT

## 2025-05-06 ASSESSMENT — PAIN - FUNCTIONAL ASSESSMENT: PAIN_FUNCTIONAL_ASSESSMENT: NONE - DENIES PAIN

## 2025-05-06 NOTE — ED TRIAGE NOTES
Patient reports he is depressed. He has been trying to get back into the [facility] day program. He reports he is not allowed to go back there because he missed to many days. Patient reports sometimes he feels like he might hurt himself. Patient reports he \"sometimes\" thinks about how he might hurt himself, reports he would stick his fingers down his throat to make himself throw up

## 2025-05-06 NOTE — ED PROVIDER NOTES
[facility] EMERGENCY DEPARTMENT  EMERGENCY DEPARTMENT ENCOUNTER      Pt Name: Cristhian Washington  MRN: [id number]  Birthdate 1980  Date of evaluation: 5/6/2025  Provider: Farshad Michelle PA-C    CHIEF COMPLAINT       Chief Complaint   Patient presents with    Mental Health Problem         HISTORY OF PRESENT ILLNESS    Patient is an 45 y.o. male with history of OCD, aggressive outburst, psychiatric disorder, and substance disorder who presents to the ER with reports of feeling depressed. Patient reports he thinks his medications are working and he is still taking them. Patient reports he feels like he needs more support. Patient currently lives at home with his mother. Patient reports he was initially interested in the [facility] day program, but he would lose his provider that he likes. Patient reports he feels like he might hurt himself with plans of sticking his fingers down his throat to make himself throw up. Patient denies chest pain, shortness of breath, abdominal pain, urinary symptoms, nausea or vomiting, diarrhea or constipation, headache, dizziness, lightheadedness, fever or chills. Patient denies alcohol use, reports daily cigarette smoking, denies vaping or illicit drug use.           Nursing Notes were reviewed.    REVIEW OF SYSTEMS       Review of Systems      PAST MEDICAL HISTORY     Past Medical History:   Diagnosis Date    Aggressive outburst     OCD (obsessive compulsive disorder)     Psychiatric disorder     Substance abuse (HCC)          SURGICAL HISTORY     No past surgical history on file.      CURRENT MEDICATIONS       Discharge Medication List as of 5/6/2025  9:40 PM        CONTINUE these medications which have NOT CHANGED    Details   methylPREDNISolone (MEDROL DOSEPACK) 4 MG tablet Take by mouth., Disp-1 kit, R-0Normal      !! diclofenac sodium (VOLTAREN) 1 % GEL Apply 2 g topically 4 times daily, Topical, 4 TIMES DAILY Starting Sat 7/20/2024, Disp-100 g, R-0, Normal      !! diclofenac

## 2025-05-07 NOTE — ED NOTES
I called his cell phone and he answered. He said he felt safe now. He says he lives with his mom and uncle and they are with him. I told him we would be happy to help him if he needed us, he could come back and speak with a mental health worker. He said he understood.

## 2025-05-20 ENCOUNTER — HOSPITAL ENCOUNTER (EMERGENCY)
Facility: HOSPITAL | Age: 45
Discharge: HOME | End: 2025-05-20
Payer: MEDICARE

## 2025-05-20 ENCOUNTER — APPOINTMENT (OUTPATIENT)
Facility: HOSPITAL | Age: 45
End: 2025-05-20
Payer: MEDICARE

## 2025-05-20 VITALS
SYSTOLIC BLOOD PRESSURE: 138 MMHG | WEIGHT: 235.89 LBS | HEIGHT: 68 IN | TEMPERATURE: 99.5 F | OXYGEN SATURATION: 96 % | RESPIRATION RATE: 17 BRPM | BODY MASS INDEX: 35.75 KG/M2 | DIASTOLIC BLOOD PRESSURE: 85 MMHG | HEART RATE: 99 BPM

## 2025-05-20 DIAGNOSIS — M19.072 OSTEOARTHRITIS OF BOTH FEET, UNSPECIFIED OSTEOARTHRITIS TYPE: ICD-10-CM

## 2025-05-20 DIAGNOSIS — M77.8 ENTHESOPATHY OF BOTH FEET: Primary | ICD-10-CM

## 2025-05-20 DIAGNOSIS — M19.071 OSTEOARTHRITIS OF BOTH FEET, UNSPECIFIED OSTEOARTHRITIS TYPE: ICD-10-CM

## 2025-05-20 PROCEDURE — 73610 X-RAY EXAM OF ANKLE: CPT

## 2025-05-20 PROCEDURE — 96372 THER/PROPH/DIAG INJ SC/IM: CPT

## 2025-05-20 PROCEDURE — 6360000002 HC RX W HCPCS

## 2025-05-20 PROCEDURE — 73630 X-RAY EXAM OF FOOT: CPT

## 2025-05-20 PROCEDURE — 99284 EMERGENCY DEPT VISIT MOD MDM: CPT

## 2025-05-20 RX ORDER — IBUPROFEN 600 MG/1
600 TABLET, FILM COATED ORAL EVERY 6 HOURS PRN
Qty: 40 TABLET | Refills: 0 | Status: SHIPPED | OUTPATIENT
Start: 2025-05-20

## 2025-05-20 RX ORDER — ACETAMINOPHEN 500 MG
1000 TABLET ORAL EVERY 6 HOURS PRN
Qty: 80 TABLET | Refills: 0 | Status: SHIPPED | OUTPATIENT
Start: 2025-05-20

## 2025-05-20 RX ORDER — KETOROLAC TROMETHAMINE 30 MG/ML
30 INJECTION, SOLUTION INTRAMUSCULAR; INTRAVENOUS
Status: COMPLETED | OUTPATIENT
Start: 2025-05-20 | End: 2025-05-20

## 2025-05-20 RX ADMIN — KETOROLAC TROMETHAMINE 30 MG: 30 INJECTION, SOLUTION INTRAMUSCULAR at 18:59

## 2025-05-20 ASSESSMENT — PAIN SCALES - GENERAL: PAINLEVEL_OUTOF10: 10

## 2025-05-20 NOTE — ED PROVIDER NOTES
[facility] EMERGENCY DEPT  EMERGENCY DEPARTMENT HISTORY AND PHYSICAL EXAM      Date of evaluation: 5/20/2025  Patient Name: Cristhian Washington  Birthdate 1980  MRN: [id number]  ED Provider: VICTOR M Belcher NP   Note Started: 6:22 PM EDT 5/20/25    HISTORY OF PRESENT ILLNESS     Chief Complaint   Patient presents with    Foot Pain     Pt reports bilateral foot pain after working in the yard. Pt has taken ibuprofen without relief.        History Provided By: Patient, only     HPI: Cristhian Washington is a 45 y.o. male with past medical history as listed below, presents ambulatory to emergency room with complaints of bilateral ankle and foot pain.  The pain started few weeks ago after he started doing yard work daily.  His ankles and feet mostly only bother him while he is working and get better throughout the day.  He takes ibuprofen which helps some.  Patient stating \"I am here because I want to know why I am hurting.\"  Denies fall, injury, previous surgeries to these areas; no other abnormalities reported.  Patient otherwise in his usual state of health.  No pain medication taken prior to arrival. Upon arrival to the ED pt is alert and oriented x 3, well-appearing, and interacting appropriately; no obvious distress noted.      PAST MEDICAL HISTORY   Past Medical History:  Past Medical History:   Diagnosis Date    Aggressive outburst     OCD (obsessive compulsive disorder)     Psychiatric disorder     Substance abuse (HCC)        Past Surgical History:  History reviewed. No pertinent surgical history.    Family History:  No family history on file.    Social History:  Social History     Tobacco Use    Smoking status: Every Day     Current packs/day: 0.50     Types: Cigarettes    Smokeless tobacco: Never   Substance Use Topics    Alcohol use: Yes     Alcohol/week: 21.0 standard drinks of alcohol    Drug use: Yes     Types: Marijuana (Weed)       Allergies:  Allergies   Allergen Reactions    Risperdal [Risperidone]

## 2025-05-20 NOTE — DISCHARGE INSTRUCTIONS
You can take Tylenol 1000 mg (2 extra strength tablets) and ibuprofen 600 mg (3 tablets) at the same time every 6 hours as needed for pain.  Be sure to take this medication with food as it could cause some stomach irritation when taken on an empty stomach.                  Thank you for choosing our Emergency Department for your care.  It is our privilege to care for you in your time of need.  In the next several days, you may receive a survey via email or mailed to your home about your experience with our team.  We would greatly appreciate you taking a few minutes to complete the survey, as we use this information to learn what we have done well and what we could be doing better. Thank you for trusting us with your care!    Below you will find a list of your tests from today's visit.   Labs and Radiology Studies  No results found for this or any previous visit (from the past 12 hours).  XR ANKLE RIGHT (MIN 3 VIEWS)  Result Date: 5/20/2025  EXAM: XR ANKLE LEFT (MIN 3 VIEWS), EXAM: XR ANKLE RIGHT (MIN 3 VIEWS), EXAM: XR FOOT LEFT (MIN 3 VIEWS), EXAM: XR FOOT RIGHT (MIN 3 VIEWS) INDICATION: Bilateral foot and ankle injury, medial; The pain started few weeks ago after he started doing yard work daily. COMPARISON: Bilateral foot radiographs 9/27/2024. TECHNIQUE: Bilateral feet and ankles, 3 views each FINDINGS: No acute fractures or dislocations. Mild osteoarthritis. Small calcaneal enthesophytes. Diffuse soft tissue swelling.     No acute fracture of the bilateral feet or ankles. Diffuse soft tissue swelling. Nonacute, but potentially painful, findings as above. Electronically signed by FOSTER MELVIN    XR ANKLE LEFT (MIN 3 VIEWS)  Result Date: 5/20/2025  EXAM: XR ANKLE LEFT (MIN 3 VIEWS), EXAM: XR ANKLE RIGHT (MIN 3 VIEWS), EXAM: XR FOOT LEFT (MIN 3 VIEWS), EXAM: XR FOOT RIGHT (MIN 3 VIEWS) INDICATION: Bilateral foot and ankle injury, medial; The pain started few weeks ago after he started doing yard work daily.

## 2025-08-20 ENCOUNTER — HOSPITAL ENCOUNTER (EMERGENCY)
Facility: HOSPITAL | Age: 45
Discharge: HOME | End: 2025-08-20
Attending: EMERGENCY MEDICINE
Payer: MEDICARE

## 2025-08-20 VITALS
HEIGHT: 68 IN | HEART RATE: 96 BPM | TEMPERATURE: 98.2 F | DIASTOLIC BLOOD PRESSURE: 98 MMHG | SYSTOLIC BLOOD PRESSURE: 149 MMHG | WEIGHT: 237.22 LBS | OXYGEN SATURATION: 93 % | BODY MASS INDEX: 35.95 KG/M2 | RESPIRATION RATE: 18 BRPM

## 2025-08-20 DIAGNOSIS — Z78.9 NEEDS ASSISTANCE WITH COMMUNITY RESOURCES: Primary | ICD-10-CM

## 2025-08-20 PROCEDURE — 99282 EMERGENCY DEPT VISIT SF MDM: CPT

## 2025-08-20 ASSESSMENT — PAIN SCALES - GENERAL
PAINLEVEL_OUTOF10: 0
PAINLEVEL_OUTOF10: 0

## 2025-08-20 ASSESSMENT — PAIN - FUNCTIONAL ASSESSMENT
PAIN_FUNCTIONAL_ASSESSMENT: 0-10
PAIN_FUNCTIONAL_ASSESSMENT: 0-10